# Patient Record
Sex: FEMALE | Race: WHITE | NOT HISPANIC OR LATINO | Employment: FULL TIME | URBAN - METROPOLITAN AREA
[De-identification: names, ages, dates, MRNs, and addresses within clinical notes are randomized per-mention and may not be internally consistent; named-entity substitution may affect disease eponyms.]

---

## 2018-01-25 PROBLEM — M48.02 CERVICAL STENOSIS OF SPINAL CANAL: Status: ACTIVE | Noted: 2018-01-25

## 2023-04-23 ENCOUNTER — APPOINTMENT (OUTPATIENT)
Dept: CT IMAGING | Facility: HOSPITAL | Age: 58
DRG: 66 | End: 2023-04-23

## 2023-04-23 ENCOUNTER — HOSPITAL ENCOUNTER (INPATIENT)
Facility: HOSPITAL | Age: 58
LOS: 4 days | Discharge: HOME | DRG: 66 | End: 2023-04-27
Attending: INTERNAL MEDICINE | Admitting: STUDENT IN AN ORGANIZED HEALTH CARE EDUCATION/TRAINING PROGRAM

## 2023-04-23 ENCOUNTER — APPOINTMENT (OUTPATIENT)
Dept: OTHER | Facility: HOSPITAL | Age: 58
DRG: 66 | End: 2023-04-23

## 2023-04-23 DIAGNOSIS — M48.02 CERVICAL STENOSIS OF SPINAL CANAL: Primary | ICD-10-CM

## 2023-04-23 DIAGNOSIS — G43.009 MIGRAINE WITHOUT AURA AND WITHOUT STATUS MIGRAINOSUS, NOT INTRACTABLE: Chronic | ICD-10-CM

## 2023-04-23 DIAGNOSIS — R27.0 ATAXIA: ICD-10-CM

## 2023-04-23 PROBLEM — G93.89 BRAIN MASS: Status: ACTIVE | Noted: 2023-04-23

## 2023-04-23 PROBLEM — G43.909 MIGRAINES: Chronic | Status: ACTIVE | Noted: 2023-04-23

## 2023-04-23 PROBLEM — Z72.0 TOBACCO ABUSE: Chronic | Status: ACTIVE | Noted: 2023-04-23

## 2023-04-23 LAB
ALBUMIN SERPL-MCNC: 4.4 G/DL (ref 3.5–5.2)
ALBUMIN/GLOB SERPL: 1.7 G/DL
ALP SERPL-CCNC: 129 U/L (ref 39–117)
ALT SERPL W P-5'-P-CCNC: 7 U/L (ref 1–33)
ANION GAP SERPL CALCULATED.3IONS-SCNC: 12 MMOL/L (ref 5–15)
AST SERPL-CCNC: 14 U/L (ref 1–32)
BASOPHILS # BLD AUTO: 0.07 10*3/MM3 (ref 0–0.2)
BASOPHILS NFR BLD AUTO: 0.4 % (ref 0–1.5)
BILIRUB SERPL-MCNC: 0.2 MG/DL (ref 0–1.2)
BUN SERPL-MCNC: 8 MG/DL (ref 6–20)
BUN/CREAT SERPL: 12.7 (ref 7–25)
CALCIUM SPEC-SCNC: 9.5 MG/DL (ref 8.6–10.5)
CHLORIDE SERPL-SCNC: 109 MMOL/L (ref 98–107)
CO2 SERPL-SCNC: 19 MMOL/L (ref 22–29)
CREAT SERPL-MCNC: 0.63 MG/DL (ref 0.57–1)
DEPRECATED RDW RBC AUTO: 51 FL (ref 37–54)
EGFRCR SERPLBLD CKD-EPI 2021: 103.6 ML/MIN/1.73
EOSINOPHIL # BLD AUTO: 0 10*3/MM3 (ref 0–0.4)
EOSINOPHIL NFR BLD AUTO: 0 % (ref 0.3–6.2)
ERYTHROCYTE [DISTWIDTH] IN BLOOD BY AUTOMATED COUNT: 13.8 % (ref 12.3–15.4)
GLOBULIN UR ELPH-MCNC: 2.6 GM/DL
GLUCOSE BLDC GLUCOMTR-MCNC: 129 MG/DL (ref 70–130)
GLUCOSE SERPL-MCNC: 162 MG/DL (ref 65–99)
HCT VFR BLD AUTO: 44.9 % (ref 34–46.6)
HGB BLD-MCNC: 15 G/DL (ref 12–15.9)
IMM GRANULOCYTES # BLD AUTO: 0.15 10*3/MM3 (ref 0–0.05)
IMM GRANULOCYTES NFR BLD AUTO: 0.9 % (ref 0–0.5)
LYMPHOCYTES # BLD AUTO: 1.02 10*3/MM3 (ref 0.7–3.1)
LYMPHOCYTES NFR BLD AUTO: 6.3 % (ref 19.6–45.3)
MCH RBC QN AUTO: 33.2 PG (ref 26.6–33)
MCHC RBC AUTO-ENTMCNC: 33.4 G/DL (ref 31.5–35.7)
MCV RBC AUTO: 99.3 FL (ref 79–97)
MONOCYTES # BLD AUTO: 0.03 10*3/MM3 (ref 0.1–0.9)
MONOCYTES NFR BLD AUTO: 0.2 % (ref 5–12)
NEUTROPHILS NFR BLD AUTO: 14.85 10*3/MM3 (ref 1.7–7)
NEUTROPHILS NFR BLD AUTO: 92.2 % (ref 42.7–76)
NRBC BLD AUTO-RTO: 0 /100 WBC (ref 0–0.2)
PLATELET # BLD AUTO: 417 10*3/MM3 (ref 140–450)
PMV BLD AUTO: 10.4 FL (ref 6–12)
POTASSIUM SERPL-SCNC: 4 MMOL/L (ref 3.5–5.2)
PROT SERPL-MCNC: 7 G/DL (ref 6–8.5)
RBC # BLD AUTO: 4.52 10*6/MM3 (ref 3.77–5.28)
SODIUM SERPL-SCNC: 140 MMOL/L (ref 136–145)
WBC NRBC COR # BLD: 16.12 10*3/MM3 (ref 3.4–10.8)

## 2023-04-23 PROCEDURE — 80053 COMPREHEN METABOLIC PANEL: CPT | Performed by: NURSE PRACTITIONER

## 2023-04-23 PROCEDURE — 82962 GLUCOSE BLOOD TEST: CPT

## 2023-04-23 PROCEDURE — 71260 CT THORAX DX C+: CPT

## 2023-04-23 PROCEDURE — 74177 CT ABD & PELVIS W/CONTRAST: CPT

## 2023-04-23 PROCEDURE — 99223 1ST HOSP IP/OBS HIGH 75: CPT | Performed by: NURSE PRACTITIONER

## 2023-04-23 PROCEDURE — G0378 HOSPITAL OBSERVATION PER HR: HCPCS

## 2023-04-23 PROCEDURE — 25510000001 IOPAMIDOL 61 % SOLUTION: Performed by: INTERNAL MEDICINE

## 2023-04-23 PROCEDURE — 99223 1ST HOSP IP/OBS HIGH 75: CPT

## 2023-04-23 PROCEDURE — 25010000002 DEXAMETHASONE PER 1 MG

## 2023-04-23 PROCEDURE — 25010000002 LEVETIRACETAM IN NACL 0.82% 500 MG/100ML SOLUTION

## 2023-04-23 PROCEDURE — 85025 COMPLETE CBC W/AUTO DIFF WBC: CPT | Performed by: NURSE PRACTITIONER

## 2023-04-23 RX ORDER — BISACODYL 10 MG
10 SUPPOSITORY, RECTAL RECTAL DAILY PRN
Status: DISCONTINUED | OUTPATIENT
Start: 2023-04-23 | End: 2023-04-27 | Stop reason: HOSPADM

## 2023-04-23 RX ORDER — POLYETHYLENE GLYCOL 3350 17 G/17G
17 POWDER, FOR SOLUTION ORAL DAILY PRN
Status: DISCONTINUED | OUTPATIENT
Start: 2023-04-23 | End: 2023-04-27 | Stop reason: HOSPADM

## 2023-04-23 RX ORDER — ACETAMINOPHEN 325 MG/1
650 TABLET ORAL EVERY 4 HOURS PRN
Status: DISCONTINUED | OUTPATIENT
Start: 2023-04-23 | End: 2023-04-27 | Stop reason: HOSPADM

## 2023-04-23 RX ORDER — LEVETIRACETAM 5 MG/ML
500 INJECTION INTRAVASCULAR EVERY 12 HOURS SCHEDULED
Status: DISCONTINUED | OUTPATIENT
Start: 2023-04-23 | End: 2023-04-27 | Stop reason: HOSPADM

## 2023-04-23 RX ORDER — DEXAMETHASONE SODIUM PHOSPHATE 4 MG/ML
4 INJECTION, SOLUTION INTRA-ARTICULAR; INTRALESIONAL; INTRAMUSCULAR; INTRAVENOUS; SOFT TISSUE EVERY 6 HOURS
Status: DISCONTINUED | OUTPATIENT
Start: 2023-04-23 | End: 2023-04-24

## 2023-04-23 RX ORDER — SODIUM CHLORIDE 0.9 % (FLUSH) 0.9 %
10 SYRINGE (ML) INJECTION AS NEEDED
Status: DISCONTINUED | OUTPATIENT
Start: 2023-04-23 | End: 2023-04-27 | Stop reason: HOSPADM

## 2023-04-23 RX ORDER — AMOXICILLIN 250 MG
2 CAPSULE ORAL 2 TIMES DAILY PRN
Status: DISCONTINUED | OUTPATIENT
Start: 2023-04-23 | End: 2023-04-27 | Stop reason: HOSPADM

## 2023-04-23 RX ORDER — ALPRAZOLAM 0.5 MG/1
0.5 TABLET ORAL 3 TIMES DAILY PRN
Status: DISCONTINUED | OUTPATIENT
Start: 2023-04-23 | End: 2023-04-27 | Stop reason: HOSPADM

## 2023-04-23 RX ORDER — FAMOTIDINE 20 MG/1
40 TABLET, FILM COATED ORAL DAILY
Status: DISCONTINUED | OUTPATIENT
Start: 2023-04-23 | End: 2023-04-27 | Stop reason: HOSPADM

## 2023-04-23 RX ORDER — SODIUM CHLORIDE 9 MG/ML
40 INJECTION, SOLUTION INTRAVENOUS AS NEEDED
Status: DISCONTINUED | OUTPATIENT
Start: 2023-04-23 | End: 2023-04-27 | Stop reason: HOSPADM

## 2023-04-23 RX ORDER — ACETAMINOPHEN 650 MG/1
650 SUPPOSITORY RECTAL EVERY 4 HOURS PRN
Status: DISCONTINUED | OUTPATIENT
Start: 2023-04-23 | End: 2023-04-27 | Stop reason: HOSPADM

## 2023-04-23 RX ORDER — HYDROCODONE BITARTRATE AND ACETAMINOPHEN 5; 325 MG/1; MG/1
1 TABLET ORAL ONCE AS NEEDED
Status: COMPLETED | OUTPATIENT
Start: 2023-04-23 | End: 2023-04-23

## 2023-04-23 RX ORDER — BISACODYL 5 MG/1
5 TABLET, DELAYED RELEASE ORAL DAILY PRN
Status: DISCONTINUED | OUTPATIENT
Start: 2023-04-23 | End: 2023-04-27 | Stop reason: HOSPADM

## 2023-04-23 RX ORDER — ACETAMINOPHEN 160 MG/5ML
650 SOLUTION ORAL EVERY 4 HOURS PRN
Status: DISCONTINUED | OUTPATIENT
Start: 2023-04-23 | End: 2023-04-27 | Stop reason: HOSPADM

## 2023-04-23 RX ORDER — SODIUM CHLORIDE 9 MG/ML
100 INJECTION, SOLUTION INTRAVENOUS CONTINUOUS
Status: DISCONTINUED | OUTPATIENT
Start: 2023-04-23 | End: 2023-04-27 | Stop reason: HOSPADM

## 2023-04-23 RX ORDER — SODIUM CHLORIDE 0.9 % (FLUSH) 0.9 %
10 SYRINGE (ML) INJECTION EVERY 12 HOURS SCHEDULED
Status: DISCONTINUED | OUTPATIENT
Start: 2023-04-23 | End: 2023-04-27 | Stop reason: HOSPADM

## 2023-04-23 RX ORDER — NICOTINE 21 MG/24HR
1 PATCH, TRANSDERMAL 24 HOURS TRANSDERMAL
Status: DISCONTINUED | OUTPATIENT
Start: 2023-04-23 | End: 2023-04-27 | Stop reason: HOSPADM

## 2023-04-23 RX ADMIN — Medication 1 PATCH: at 19:42

## 2023-04-23 RX ADMIN — DEXAMETHASONE SODIUM PHOSPHATE 4 MG: 4 INJECTION INTRA-ARTICULAR; INTRALESIONAL; INTRAMUSCULAR; INTRAVENOUS; SOFT TISSUE at 21:07

## 2023-04-23 RX ADMIN — Medication 10 ML: at 21:08

## 2023-04-23 RX ADMIN — HYDROCODONE BITARTRATE AND ACETAMINOPHEN 1 TABLET: 5; 325 TABLET ORAL at 23:19

## 2023-04-23 RX ADMIN — LEVETIRACETAM 500 MG: 5 INJECTION INTRAVASCULAR at 21:07

## 2023-04-23 RX ADMIN — FAMOTIDINE 40 MG: 20 TABLET, FILM COATED ORAL at 19:39

## 2023-04-23 RX ADMIN — ACETAMINOPHEN 325MG 650 MG: 325 TABLET ORAL at 21:02

## 2023-04-23 RX ADMIN — IOPAMIDOL 80 ML: 612 INJECTION, SOLUTION INTRAVENOUS at 22:17

## 2023-04-23 NOTE — PLAN OF CARE
Goal Outcome Evaluation:           Progress: no change  Outcome Evaluation: Patient A&Ox4, denies any neuro deficits at this time. Outside films reviewed by neurosurgery- No obvious stroke, appears as mass. Patient/family updated on assessment and plan of care.

## 2023-04-23 NOTE — H&P
[facility] Medicine Services  HISTORY AND PHYSICAL    Patient Name: Charley Mckee  : 1965  MRN: [id number]  Primary Care Physician: Ana Maria Garcia MD  Date of admission: 2023    Subjective   Subjective     Chief Complaint:  H/a, brain mass, ataxia     HPI:  Charley Mckee is a 58 y.o. female with past medical history significant only for tobacco abuse.  Patient does not see a physician regularly.  Reports history of remote right hip pain with questionable bone lesion in 2018 with final work-up from Dr. Star Chiang negative.  Presents to [facility] on Tuesday, 2023 secondary to headache, short term memory deficits,  right upper & lower extremity numbness/tingling, balance disturbance and mild vision deficit in right eye.  Was treated for migraine and discharged home.  Reports her balance difficulties, STM deficits, and right-sided numbness/ tingling persisted.  Developed headache again this morning and presents back to ER at [facility].  CT showed CVA vs mass in the brain.  MRI consistent with Lt occipital mass.  She was transferred to [facility] for higher level of care.      Review of Systems   Constitutional: Positive for activity change and fatigue. Negative for appetite change, chills, diaphoresis, fever and unexpected weight change.   Eyes: Positive for visual disturbance.        Rt eye vision change, blurred and floaters in visual field    Respiratory: Negative.    Cardiovascular: Negative.    Gastrointestinal: Negative.    Endocrine: Negative.    Genitourinary: Negative.    Musculoskeletal: Negative.    Skin: Negative.    Allergic/Immunologic: Negative.    Neurological: Positive for numbness and headaches. Negative for dizziness, seizures, syncope, facial asymmetry, speech difficulty and light-headedness.   Psychiatric/Behavioral: Positive for decreased concentration. The patient is nervous/anxious.              Personal History     Past Medical History:   Diagnosis Date   • Arthritis    • Headache    • Migraines 4/23/2023   • Tobacco abuse 4/23/2023             Past Surgical History:   Procedure Laterality Date   • BREAST EXCISIONAL BIOPSY Right     AGE 36   • NECK SURGERY      ACDF C4-5, C5-6 w/ Dr. Aleman       Family History:  family history includes Breast cancer in her maternal aunt, maternal aunt, and maternal aunt; Breast cancer (age of onset: 74) in her mother; Cancer in her father, mother, and son; Diabetes in her father and mother; Heart disease in her father; Stroke in her father and mother.     Social History:  reports that she has been smoking cigarettes. She has a 40.00 pack-year smoking history. She has never used smokeless tobacco. She reports that she does not drink alcohol and does not use drugs.  Social History     Social History Narrative    .  Lives with . Independent at baseline.  No HH, DMe or oxygen        Medications: None regularly        No Known Allergies    Objective   Objective     Vital Signs:   Temp:  [98.8 °F (37.1 °C)] 98.8 °F (37.1 °C)  Heart Rate:  [75] 75  Resp:  [18] 18  BP: (121)/(43) 121/43    Physical Exam   Constitutional: Awake, alert.  Resting up in bed with family at bedside.  No acute distress.  Eyes: PERRLA, sclerae anicteric, no conjunctival injection.  No nystagmus.  EOMs intact.  HENT: NCAT, mucous membranes moist  Neck: Supple, no thyromegaly, no lymphadenopathy, trachea midline  Respiratory: Clear to auscultation bilaterally, nonlabored respirations on room air.  Sats 98%.  Cardiovascular: RRR, no murmurs, rubs, or gallops, palpable pedal pulses bilaterally  Gastrointestinal: Positive bowel sounds, soft, nontender, nondistended  Musculoskeletal: No bilateral ankle edema, no clubbing or cyanosis to extremities.  SANDOVAL spontaneously.  Psychiatric: Appropriate affect, cooperative  Neurologic: Oriented x 3, strength symmetric in all extremities, Cranial  Nerves grossly intact to confrontation, speech clear.  No focal deficits.  Reports intermittent numbness and tingling to right upper and lower extremity.  Good sensation to right side on exam.  Skin: No rashes      Result Review:  I have personally reviewed the results from the time of this admission to 4/23/2023 18:38 EDT and agree with these findings:  [x]  Laboratory list / accordion  []  Microbiology  [x]  Radiology  []  EKG/Telemetry   []  Cardiology/Vascular   []  Pathology  [x]  Old records  [x]  Other: OSH records  Most notable findings include:     LAB RESULTS:      Lab 04/18/23  1123   PROTIME 9.6         Lab 04/18/23  1123   MAGNESIUM 1.7*             Lab 04/18/23  1123   PROTIME 9.6   INR 0.91                 Brief Urine Lab Results     None        Microbiology Results (last 10 days)     ** No results found for the last 240 hours. **          MRI Outside Films    Result Date: 4/23/2023  This procedure was auto-finalized with no dictation required.    CT Outside Films    Result Date: 4/23/2023  This procedure was auto-finalized with no dictation required.    CT Outside Films    Result Date: 4/23/2023  This procedure was auto-finalized with no dictation required.          Assessment & Plan   Assessment & Plan       Ataxia    Brain mass    Migraines    Tobacco abuse    Charley Mckee is a 58 y.o. female with past medical history significant only for tobacco abuse.  Patient does not see a physician regularly.  Reports history of remote right hip pain with questionable bone lesion in June 2018 with final work-up from Dr. Star Chiang negative.  Presents to [facility] on Tuesday, 4/18/2023 secondary to headache, short term memory deficits,  right upper & lower extremity numbness/tingling, balance disturbance and mild vision deficit in right eye.  Was treated for migraine and discharged home.  Reports her balance difficulties, STM deficits, and right-sided numbness/ tingling persisted.   Developed headache again this morning and presents back to ER at [facility].  CT showed CVA vs mass in the brain.  MRI consistent with Lt occipital mass.  She was transferred to [facility] for higher level of care.      Assessment/Plan:    Left occipital brain mass  Headaches  Ataxia  Right eye visual deficit/floaters  Rt upper/lower ext intermittent numbness and tingling  -- Mass found on CT/MRI from OSH [facility] today.  -- Stroke neuro consulted.  [place] not to be stroke related.  -- Neurosurgery consulted.  ER spoke with Henry Hagen PA-C.  They will see tomorrow.  -- Decadron 4 mg IV every 6 hrs  -- Symptom management.  -- Checking CT abdomen/pelvis and chest with contrast  -- monitor labs     History right hip pain w ?  Lytic lesion from [facility] 2018  -- Was followed by Dr. Chiang with oncology.  Per chart review and patient report work-up turned out to be negative for malignancy.  No further issues.    Tobacco abuse  -- Assist/advise, nicotine patch      DVT prophylaxis:  sequentials    CODE STATUS:    Level Of Support Discussed With: Patient  Code Status (Patient has no pulse and is not breathing): CPR (Attempt to Resuscitate)  Medical Interventions (Patient has pulse or is breathing): Full Support      Expected Discharge    Expected Discharge Date and Time     Expected Discharge Date Expected Discharge Time    Apr 27, 2023             This note has been completed as part of a split-shared workflow.     Signature: Electronically signed by More Gregg, ANJALI, 04/23/23, 6:31 PM EDT    Total time spent: 70 minutes   Time spent includes time reviewing chart, face-to-face time, counseling patient/family/caregiver, ordering medications/tests/procedures, communicating with other health care professionals, documenting clinical information in the electronic health record, and coordination of care.

## 2023-04-23 NOTE — NURSING NOTE
"  ACC REVIEW REPORT: [facility]        PATIENT NAME: Charley Huber/Rafi    PATIENT ID: [id number]    BED: S 330    BED TYPE: telemetry    BED GIVEN TO: Brendan    TIME BED GIVEN: 1457 (after waiting for bed to be cleaned)    TODAY'S DATE: 4/23/2023    TRANSFER DATE: 4-23-23    ETA: after 1530    TRANSFERRING FACILITY: [facility]    TRANSFERRING FACILITY PHONE # : [phone]    TRANSFERRING MD: OMID Ramirez    DATE/TIME REQUEST RECEIVED: 4-23-23@8851    Located within [facility] RN: Laquita Mishra    REPORT FROM: Marj in the ED    TIME REPORT TAKEN: 1346    DIAGNOSIS: brain mass vs. Possible CVA    REASON FOR TRANSFER TO [facility] within [facility]: higher level of care    TRANSPORTATION: EMS    CLINICAL REASON FOR TRANSFER TO [facility] within [facility]: 58 y.o. saw her pc provider last Tuesday and was given topiramate & fioricet for suspected migraine - this has not helped  Found to have a brain mass      CLINICAL INFORMATION    HEIGHT: 5'1\"    WEIGHT: 49.9kg    ALLERGIES: NKA    INFECTIOUS DISEASE:     ISOLATION:     VITAL SIGNS:   TIME: 1330  TEMP: 98.8  PULSE: 74  B/P: 157/68  RESP: 20        LAB INFORMATION: bun 9, Cr 0.7, WBC 10, H&H: 14/43, plt 430    CULTURE INFORMATION:     MEDS/IV FLUIDS: #20 left ac - SL  Morphine 2mg given in the ED      CARDIAC SYSTEM:    CHEST PAIN: none reported    RHYTHM: NSR    Is patient taking or has patient been given any drugs that could increase bleeding? no      RESPIRATORY SYSTEM:    OXYGEN: no    O2 SAT: 98% on RA    RESPIRATORY STATUS: RR 20      CNS/MUSCULOSKELETAL    ALERT AND ORIENTED:  yes    CAT SCAN RESULTS: two opacities; ? old infarct    MRI RESULTS: 2.6cm mass left occipital lobe; likely primary brain mass or metastasis    CNS/MUSCULOSKELETAL NOTES: NIHSS not done  Pt ambulatory, gate is steady; pt is a&o and appropriate; no slurred speech; SANDOVAL  Only c/o is HA - it was severe on arrival, now it is mild after the morphine      GI//GY    VOMITING: no    NAUSEA: no    PAST MEDICAL HISTORY: hx of migraine x1 a few " years ago; smoker    ADDITIONAL NOTES: no home meds (except for topiramate/fioricet)  Daughter works at [employer] ?    Stroke nurse practitioner consulted: Jennie  Stroke MD:  Dr Nicola Mishra, RN  4/23/2023  14:14 EDT

## 2023-04-23 NOTE — CONSULTS
Stroke Consult Note    Patient Name: Charley Mckee   MRN: [id number]  Age: 58 y.o.  Sex: female  : 1965    Primary Care Physician: Ana Maria Garcia MD  Referring Physician: Mayra Quijano PA-C, [facility]    TIME STROKE TEAM CALLED: 1328 EST       TIME PATIENT ARRIVED AT [facility]: 1622 EST  TIME PATIENT SEEN: 1635 EST    Handedness: Right  Race:     Chief Complaint/Reason for Consultation: Intermittent right-sided numbness, memory loss, headache, and balance difficulties    HPI: Mrs. Mckee is a 58-year-old female with known medical diagnoses of arthritis, prior ACDF (C4-C5 and C6 5 to C6), and ongoing tobacco abuse who presents to [facility] as a transfer from [facility].  She presented to their facility today with complaints of headache and intermittent right-sided numbness and balance difficulties which have been ongoing since 2023.  When her symptoms initially started she was evaluated at a hospital in [place] where she underwent a CT of the head without contrast which was reported as normal and she was treated for a complex migraine and discharged home on Topamax and Fioricet.  Since that time, she reports that she has noticed increased memory problems, blurry vision, occasional word finding difficulties, intermittent right-sided numbness (face, upper extremity, and lower extremity), and gait instability.  She denies any recent fall, unilateral weakness, dysarthria, dysphagia, or diplopia.    The patient reports that she does not have a personal history of TIA/CVA however her mother did suffer from a mild stroke in her 70s.  She does have a family history of cardiac disease and cancer in her father.  She tells me that she does not normally see a doctor and takes no daily medications.  She smokes approximately 1 pack/day x 39 years however does not consume alcohol or utilize illicit drugs.    Per report the patient underwent a CT head  without contrast which was negative for hemorrhage and/or acute process.  She also underwent a CTA of the head/neck which was negative for flow-limiting stenosis or large vessel occlusive stroke.  MRI of the brain with and without contrast was also obtained and was concerning for brain mass +/- acute ischemic stroke.  Per radiology report the patient was noted to have hyperdensities within the left occipital lobe (mass of 2.6cm with mild edema), left thalamus, and left basal ganglia.  She was transferred to our facility for higher level of care and further evaluation by stroke neurology and/or neurosurgery.  The patient tells me that she has never been diagnosed with cancer.  She does recall that she has had an increase in the number of headaches that she has over the past months with the worst headache being last Tuesday prompting her to seek medical treatment at [facility].    Last Known Normal Date/Time: 4/18/2023 EST     Review of Systems   Constitutional: Negative.    HENT: Negative for trouble swallowing.    Eyes: Positive for visual disturbance.   Respiratory: Negative.    Cardiovascular: Negative.    Gastrointestinal: Negative.  Negative for blood in stool, diarrhea, nausea and vomiting.   Genitourinary: Negative.  Negative for hematuria.   Musculoskeletal: Positive for gait problem.   Skin: Negative.    Neurological: Positive for speech difficulty, numbness and headaches. Negative for dizziness, seizures and weakness.   Hematological: Negative.    Psychiatric/Behavioral: Positive for confusion.      Past Medical History:   Diagnosis Date   • Arthritis    • Headache      Past Surgical History:   Procedure Laterality Date   • BREAST EXCISIONAL BIOPSY Right     AGE 36   • NECK SURGERY      ACDF C4-5, C5-6 w/ Dr. Aleman     Family History   Problem Relation Age of Onset   • Cancer Mother    • Stroke Mother    • Breast cancer Mother 74   • Heart disease Father    • Stroke Father    • Diabetes  Father    • Cancer Father    • Cancer Son    • Breast cancer Maternal Aunt         DX AGE UNKNOWN   • Breast cancer Maternal Aunt         DX AGE UNKNOWN   • Breast cancer Maternal Aunt         DX AGE UNKNOWN   • Ovarian cancer Neg Hx      Social History     Socioeconomic History   • Marital status: Single   Tobacco Use   • Smoking status: Every Day     Packs/day: 1.00     Years: 40.00     Pack years: 40.00     Types: Cigarettes   • Smokeless tobacco: Never   Vaping Use   • Vaping Use: Never used   Substance and Sexual Activity   • Drug use: Never   • Sexual activity: Defer     No Known Allergies  Prior to Admission medications    Medication Sig Start Date End Date Taking? Authorizing Provider   benzonatate (TESSALON) 100 MG capsule TAKE 1 TO 2 CAPSULES BY MOUTH EVERY 8 HOURS AS NEEDED 6/5/18 4/23/23  Inessa Crawford MD   DULoxetine (CYMBALTA) 20 MG capsule Take 20 mg by mouth Daily. 6/5/18 4/23/23  Inessa Crawford MD   HYDROcodone-acetaminophen (NORCO) 5-325 MG per tablet Take 1 tablet by mouth Every 6 (Six) Hours. 5/20/18 4/23/23  Inessa Crawford MD   naproxen sodium (ALEVE) 220 MG tablet Take 220 mg by mouth 2 (Two) Times a Day As Needed.  4/23/23  Inessa Crawford MD   predniSONE (DELTASONE) 10 MG tablet Take 10 mg by mouth Daily. 6/21/18 4/23/23  Inessa Crawford MD   PROAIR  (90 Base) MCG/ACT inhaler INHALE 1 TO 2 PUFF S  BY MOUTH EVERY 4 TO 6 HOURS AS NEEDED 6/5/18 4/23/23  Inessa Crawford MD   promethazine (PHENERGAN) 25 MG tablet Take 25 mg by mouth every night at bedtime. 6/5/18 4/23/23  Inessa Crawford MD   SYMBICORT 80-4.5 MCG/ACT inhaler USE 2 INHALATIONS BY MOUTH TWICE DAILY 6/5/18 4/23/23  Inessa Crawford MD   traZODone (DESYREL) 50 MG tablet Take 50 mg by mouth every night at bedtime. 5/21/18 4/23/23  Inessa Crawford MD         Temp:  [98.8 °F (37.1 °C)] 98.8 °F (37.1 °C)  Heart Rate:  [75] 75  Resp:  [18] 18  BP: (121)/(43)  121/43  Neurological Exam  Mental Status  Alert. Oriented to person, place, time and situation. Oriented to person, place, and time. Speech is normal. Language is fluent with no aphasia. Attention and concentration are normal. Fund of knowledge is appropriate for level of education.    Cranial Nerves  CN II: Visual fields full to confrontation.  CN III, IV, VI: Extraocular movements intact bilaterally. Pupils equal round and reactive to light bilaterally.  CN V: Facial sensation is normal.  CN VII: Full and symmetric facial movement.  CN VIII: Hearing intact bilaterally.  CN IX, X: Palate elevates symmetrically  CN XII: Tongue midline without atrophy or fasciculations.    Motor  Normal muscle bulk throughout. No fasciculations present. Normal muscle tone. Strength is 5/5 throughout all four extremities.    Sensory  Sensation is intact to light touch, pinprick, vibration and proprioception in all four extremities.    Coordination  Right: Finger-to-nose normal.Left: Finger-to-nose normal.    Gait    No observed.      Physical Exam  Vitals reviewed.   Constitutional:       General: She is not in acute distress.     Appearance: Normal appearance. She is not ill-appearing.   HENT:      Head: Normocephalic and atraumatic.      Mouth/Throat:      Mouth: Mucous membranes are moist.   Eyes:      Extraocular Movements: Extraocular movements intact.      Pupils: Pupils are equal, round, and reactive to light.   Cardiovascular:      Rate and Rhythm: Normal rate and regular rhythm.   Pulmonary:      Effort: Pulmonary effort is normal. No respiratory distress.      Comments: On room air  Skin:     General: Skin is warm and dry.   Neurological:      General: No focal deficit present.      Mental Status: She is alert and oriented to person, place, and time.      Motor: Motor strength is normal.   Psychiatric:         Mood and Affect: Mood normal.         Speech: Speech normal.         Behavior: Behavior normal.         Acute Stroke  Data    Thrombolytic Inclusion / Exclusion Criteria    Time: 16:55 EDT  Person Administering Scale: ANJALI Devine    Inclusion Criteria  [x]   18 years of age or greater   []   Onset of symptoms < 4.5 hours before beginning treatment (stroke onset = time patient was last seen well or without symptoms).   []   Diagnosis of acute ischemic stroke causing measurable disabling deficit (Complete Hemianopia, Any Aphasia, Visual or Sensory Extinction, Any weakness limiting sustained effort against gravity)   []   Any remaining deficit considered potentially disabling in view of patient and practitioner   Exclusion criteria (Do not proceed with Alteplase if any are checked under exclusion criteria)  [x]   Onset unknown or GREATER than 4.5 hours   []   ICH on CT/MRI   []   CT demonstrates hypodensity representing acute or subacute infarct   []   Significant head trauma or prior stroke in the previous 3 months   []   Symptoms suggestive of subarachnoid hemorrhage   []   History of un-ruptured intracranial aneurysm GREATER than 10 mm   []   Recent intracranial or intraspinal surgery within the last 3 months   []   Arterial puncture at a non-compressible site in the previous 7 days   []   Active internal bleeding   []   Acute bleeding tendency   []   Platelet count LESS than 100,000 for known hematological diseases such as leukemia, thrombocytopenia or chronic cirrhosis   []   Current use of anticoagulant with INR GREATER than 1.7 or PT GREATER than 15 seconds, aPTT GREATER than 40 seconds   []   Heparin received within 48 hours, resulting in abnormally elevated aPTT GREATER than upper limit of normal   []   Current use of direct thrombin inhibitors or direct factor Xa inhibitors in the past 48 hours   []   Elevated blood pressure refractory to treatment (systolic GREATER than 185 mm/Hg or diastolic  GREATER than 110 mm/Hg   []   Suspected infective endocarditis and aortic arch dissection   []   Current use of  therapeutic treatment dose of low-molecular-weight heparin (LMWH) within the previous 24 hours   []   Structural GI malignancy or bleed   Relative exclusion for all patients  []   Only minor non-disabling symptoms   []   Pregnancy   []   Seizure at onset with postictal residual neurological impairments   []   Major surgery or previous trauma within past 14 days   []   History of previous spontaneous ICH, intracranial neoplasm, or AV malformation   []   Postpartum (within previous 14 days)   []   Recent GI or urinary tract hemorrhage (within previous 21 days)   []   Recent acute MI (within previous 3 months)   []   History of un-ruptured intracranial aneurysm LESS than 10 mm   []   History of ruptured intracranial aneurysm   []   Blood glucose LESS than 50 mg/dL (2.7 mmol/L)   []   Dural puncture within the last 7 days   []   Known GREATER than 10 cerebral microbleeds   Additional exclusions for patients with symptoms onset between 3 and 4.5 hours.  []   Age > 80.   []   On any anticoagulants regardless of INR  >>> Warfarin (Coumadin), Heparin, Enoxaparin (Lovenox), fondaparinux (Arixtra), bivalirudin (Angiomax), Argatroban, dabigatran (Pradaxa), rivaroxaban (Xarelto), or apixaban (Eliquis)   []   Severe stroke (NIHSS > 25).   []   History of BOTH diabetes and previous ischemic stroke.   []   The risks and benefits have been discussed with the patient or family related to the administration of IV thrombolytic therapy for stroke symptoms.   []   I have discussed and reviewed the patient's case and imaging with the attending prior to IV thrombolytic therapy.   N/A Time IV thrombolytic administered       Hospital Meds:  Scheduled-   Infusions- No current facility-administered medications for this encounter.     PRNs-     Functional Status Prior to Current Stroke/Kait Score: 0    NIH Stroke Scale  Time: 16:55 EDT  Person Administering Scale: Jennie Pat, ANJALI    1a  Level of consciousness: 0=alert; keenly  responsive   1b. LOC questions:  0=Answers both questions correctly   1c. LOC commands: 0=Performs both tasks correctly   2.  Best Gaze: 0=normal   3.  Visual: 0=No visual loss   4. Facial Palsy: 0=Normal symmetric movement   5a.  Motor left arm: 0=No drift, limb holds 90 (or 45) degrees for full 10 seconds   5b.  Motor right arm: 0=No drift, limb holds 90 (or 45) degrees for full 10 seconds   6a. motor left le=No drift, limb holds 90 (or 45) degrees for full 10 seconds   6b  Motor right le=No drift, limb holds 90 (or 45) degrees for full 10 seconds   7. Limb Ataxia: 0=Absent   8.  Sensory: 0=Normal; no sensory loss   9. Best Language:  0=No aphasia, normal   10. Dysarthria: 0=Normal   11. Extinction and Inattention: 0=No abnormality    Total:   0       Results Reviewed:  I have personally reviewed current lab, radiology, and data and agree with results.    MRI of the brain with and without contrast reveals a mass within the left occipital region.  Additionally there are 2 hyperintensities noted in the right basal ganglia and thalamus which are present on ADC and DWI sequences; atypical for acute or chronic infarct, patient is asymptomatic.    Labs from OSH    Sodium 138  Potassium 3.6  Glucose 152  Calcium 8 0.4  Creatinine 0.61, BUN 6  Magnesium 1.7  Urinalysis +1 bacteria, trace leukocytes, negative nitrite      Assessment/Plan:    This is a 58-year-old female with known medical diagnoses of arthritis, prior ACDF (C4-C5 and C6 5 to C6), and ongoing tobacco abuse who presents to [facility] as a transfer from [facility].  She presented to their facility today with complaints of headache and intermittent right-sided numbness and balance difficulties which have been ongoing since 2023.  Due to last known well and findings on CT/MRI reported by OSH she was not a candidate for IV thrombolytic therapy.  CTA head/neck at OSH was reported as negative for large vessel  occlusive stroke.    Antiplatelet PTA: None  Anticoagulant PTA: None        1. Intermittent right-sided numbness, balance difficulties, and headache  -MRI of the brain with and without contrast from OSH report concerning for primary versus metastatic brain masses versus acute ischemic stroke (reported left cerebellar, left basal ganglia, and left thalamus).  On my review of the patient's MRI, which was uploaded into epic, there appears to be a left occipital mass as well as hyperintensities within the right basal ganglia and right thalamus (present on DWI and ADC sequences).  No evidence of acute stroke.  -Mild cerebral edema was noted and the patient received Solu-Medrol 125 mg IV at approximately 1330.  -Patient's neurological status is stable, GCS 15, NIHSS 0  -Activity as tolerated  -Fall risk precautions  - I have discussed patient with Henry Hagen PA-C for neurosurgery who has also reviewed the patient's images and will consult on her in the morning.  He recommends recommend obtaining CT chest abdomen pelvis to evaluate for primary source and Decadron 4 mg IV every 6 hours starting at 2200.  -Keppra 500 mg IV twice daily for seizure prophylaxis given patient's intermittent numbness  -Seizure precautions    2. Ongoing tobacco abuse  -Patient will need daily tobacco cessation education    Plan of care was discussed with the patient and primary team.    No role for stroke neurology at this time.  Please call with any questions or concerns.  Thank you for this consult.    Jennie Pat, APRN  April 23, 2023  16:55 EDT

## 2023-04-24 ENCOUNTER — APPOINTMENT (OUTPATIENT)
Dept: GENERAL RADIOLOGY | Facility: HOSPITAL | Age: 58
DRG: 66 | End: 2023-04-24

## 2023-04-24 LAB
ANION GAP SERPL CALCULATED.3IONS-SCNC: 12 MMOL/L (ref 5–15)
BACTERIA UR QL AUTO: ABNORMAL /HPF
BASOPHILS # BLD AUTO: 0.04 10*3/MM3 (ref 0–0.2)
BASOPHILS NFR BLD AUTO: 0.1 % (ref 0–1.5)
BILIRUB UR QL STRIP: NEGATIVE
BUN SERPL-MCNC: 7 MG/DL (ref 6–20)
BUN/CREAT SERPL: 11.7 (ref 7–25)
CALCIUM SPEC-SCNC: 8.9 MG/DL (ref 8.6–10.5)
CHLORIDE SERPL-SCNC: 111 MMOL/L (ref 98–107)
CLARITY UR: CLEAR
CO2 SERPL-SCNC: 19 MMOL/L (ref 22–29)
COLOR UR: YELLOW
CREAT SERPL-MCNC: 0.6 MG/DL (ref 0.57–1)
CRP SERPL-MCNC: 0.73 MG/DL (ref 0–0.5)
DEPRECATED RDW RBC AUTO: 51.3 FL (ref 37–54)
EGFRCR SERPLBLD CKD-EPI 2021: 104.8 ML/MIN/1.73
EOSINOPHIL # BLD AUTO: 0 10*3/MM3 (ref 0–0.4)
EOSINOPHIL NFR BLD AUTO: 0 % (ref 0.3–6.2)
ERYTHROCYTE [DISTWIDTH] IN BLOOD BY AUTOMATED COUNT: 13.9 % (ref 12.3–15.4)
GLUCOSE SERPL-MCNC: 146 MG/DL (ref 65–99)
GLUCOSE UR STRIP-MCNC: NEGATIVE MG/DL
HBA1C MFR BLD: 5.2 % (ref 4.8–5.6)
HCT VFR BLD AUTO: 42.5 % (ref 34–46.6)
HGB BLD-MCNC: 14.3 G/DL (ref 12–15.9)
HGB UR QL STRIP.AUTO: NEGATIVE
HYALINE CASTS UR QL AUTO: ABNORMAL /LPF
IMM GRANULOCYTES # BLD AUTO: 0.26 10*3/MM3 (ref 0–0.05)
IMM GRANULOCYTES NFR BLD AUTO: 1 % (ref 0–0.5)
KETONES UR QL STRIP: NEGATIVE
LEUKOCYTE ESTERASE UR QL STRIP.AUTO: ABNORMAL
LYMPHOCYTES # BLD AUTO: 1.48 10*3/MM3 (ref 0.7–3.1)
LYMPHOCYTES NFR BLD AUTO: 5.5 % (ref 19.6–45.3)
MCH RBC QN AUTO: 33.5 PG (ref 26.6–33)
MCHC RBC AUTO-ENTMCNC: 33.6 G/DL (ref 31.5–35.7)
MCV RBC AUTO: 99.5 FL (ref 79–97)
MONOCYTES # BLD AUTO: 0.34 10*3/MM3 (ref 0.1–0.9)
MONOCYTES NFR BLD AUTO: 1.3 % (ref 5–12)
NEUTROPHILS NFR BLD AUTO: 25.01 10*3/MM3 (ref 1.7–7)
NEUTROPHILS NFR BLD AUTO: 92.1 % (ref 42.7–76)
NITRITE UR QL STRIP: NEGATIVE
NRBC BLD AUTO-RTO: 0 /100 WBC (ref 0–0.2)
PH UR STRIP.AUTO: 5.5 [PH] (ref 5–8)
PLATELET # BLD AUTO: 393 10*3/MM3 (ref 140–450)
PMV BLD AUTO: 10.8 FL (ref 6–12)
POTASSIUM SERPL-SCNC: 3.9 MMOL/L (ref 3.5–5.2)
PROCALCITONIN SERPL-MCNC: 0.02 NG/ML (ref 0–0.25)
PROT UR QL STRIP: NEGATIVE
RBC # BLD AUTO: 4.27 10*6/MM3 (ref 3.77–5.28)
RBC # UR STRIP: ABNORMAL /HPF
REF LAB TEST METHOD: ABNORMAL
SODIUM SERPL-SCNC: 142 MMOL/L (ref 136–145)
SP GR UR STRIP: 1.01 (ref 1–1.03)
SQUAMOUS #/AREA URNS HPF: ABNORMAL /HPF
TSH SERPL DL<=0.05 MIU/L-ACNC: 0.83 UIU/ML (ref 0.27–4.2)
UROBILINOGEN UR QL STRIP: ABNORMAL
WBC # UR STRIP: ABNORMAL /HPF
WBC NRBC COR # BLD: 27.13 10*3/MM3 (ref 3.4–10.8)

## 2023-04-24 PROCEDURE — 86140 C-REACTIVE PROTEIN: CPT | Performed by: INTERNAL MEDICINE

## 2023-04-24 PROCEDURE — 99232 SBSQ HOSP IP/OBS MODERATE 35: CPT | Performed by: INTERNAL MEDICINE

## 2023-04-24 PROCEDURE — 81001 URINALYSIS AUTO W/SCOPE: CPT | Performed by: INTERNAL MEDICINE

## 2023-04-24 PROCEDURE — 84443 ASSAY THYROID STIM HORMONE: CPT | Performed by: NURSE PRACTITIONER

## 2023-04-24 PROCEDURE — 25010000002 DEXAMETHASONE PER 1 MG

## 2023-04-24 PROCEDURE — 25010000002 KETOROLAC TROMETHAMINE PER 15 MG: Performed by: INTERNAL MEDICINE

## 2023-04-24 PROCEDURE — 93005 ELECTROCARDIOGRAM TRACING: CPT | Performed by: INTERNAL MEDICINE

## 2023-04-24 PROCEDURE — 80048 BASIC METABOLIC PNL TOTAL CA: CPT | Performed by: NURSE PRACTITIONER

## 2023-04-24 PROCEDURE — 99254 IP/OBS CNSLTJ NEW/EST MOD 60: CPT | Performed by: PHYSICIAN ASSISTANT

## 2023-04-24 PROCEDURE — 93010 ELECTROCARDIOGRAM REPORT: CPT | Performed by: STUDENT IN AN ORGANIZED HEALTH CARE EDUCATION/TRAINING PROGRAM

## 2023-04-24 PROCEDURE — 85025 COMPLETE CBC W/AUTO DIFF WBC: CPT | Performed by: NURSE PRACTITIONER

## 2023-04-24 PROCEDURE — 97161 PT EVAL LOW COMPLEX 20 MIN: CPT

## 2023-04-24 PROCEDURE — 71045 X-RAY EXAM CHEST 1 VIEW: CPT

## 2023-04-24 PROCEDURE — 83036 HEMOGLOBIN GLYCOSYLATED A1C: CPT | Performed by: NURSE PRACTITIONER

## 2023-04-24 PROCEDURE — 84145 PROCALCITONIN (PCT): CPT | Performed by: INTERNAL MEDICINE

## 2023-04-24 PROCEDURE — 25010000002 LEVETIRACETAM IN NACL 0.82% 500 MG/100ML SOLUTION

## 2023-04-24 RX ORDER — ATORVASTATIN CALCIUM 40 MG/1
80 TABLET, FILM COATED ORAL NIGHTLY
Status: DISCONTINUED | OUTPATIENT
Start: 2023-04-24 | End: 2023-04-27 | Stop reason: HOSPADM

## 2023-04-24 RX ORDER — KETOROLAC TROMETHAMINE 15 MG/ML
15 INJECTION, SOLUTION INTRAMUSCULAR; INTRAVENOUS EVERY 6 HOURS PRN
Status: DISPENSED | OUTPATIENT
Start: 2023-04-24 | End: 2023-04-25

## 2023-04-24 RX ORDER — ASPIRIN 81 MG/1
81 TABLET ORAL DAILY
Status: DISCONTINUED | OUTPATIENT
Start: 2023-04-24 | End: 2023-04-26

## 2023-04-24 RX ADMIN — ACETAMINOPHEN 325MG 650 MG: 325 TABLET ORAL at 12:07

## 2023-04-24 RX ADMIN — DEXAMETHASONE SODIUM PHOSPHATE 4 MG: 4 INJECTION INTRA-ARTICULAR; INTRALESIONAL; INTRAMUSCULAR; INTRAVENOUS; SOFT TISSUE at 15:04

## 2023-04-24 RX ADMIN — LEVETIRACETAM 500 MG: 5 INJECTION INTRAVASCULAR at 09:05

## 2023-04-24 RX ADMIN — ALPRAZOLAM 0.5 MG: 0.5 TABLET ORAL at 09:18

## 2023-04-24 RX ADMIN — Medication 1 PATCH: at 09:07

## 2023-04-24 RX ADMIN — ACETAMINOPHEN 325MG 650 MG: 325 TABLET ORAL at 06:37

## 2023-04-24 RX ADMIN — Medication 10 ML: at 09:06

## 2023-04-24 RX ADMIN — Medication 10 ML: at 21:07

## 2023-04-24 RX ADMIN — SODIUM CHLORIDE 100 ML/HR: 9 INJECTION, SOLUTION INTRAVENOUS at 16:23

## 2023-04-24 RX ADMIN — DEXAMETHASONE SODIUM PHOSPHATE 4 MG: 4 INJECTION INTRA-ARTICULAR; INTRALESIONAL; INTRAMUSCULAR; INTRAVENOUS; SOFT TISSUE at 09:05

## 2023-04-24 RX ADMIN — SODIUM CHLORIDE 100 ML/HR: 9 INJECTION, SOLUTION INTRAVENOUS at 06:45

## 2023-04-24 RX ADMIN — FAMOTIDINE 40 MG: 20 TABLET, FILM COATED ORAL at 09:05

## 2023-04-24 RX ADMIN — DEXAMETHASONE SODIUM PHOSPHATE 4 MG: 4 INJECTION INTRA-ARTICULAR; INTRALESIONAL; INTRAMUSCULAR; INTRAVENOUS; SOFT TISSUE at 04:59

## 2023-04-24 RX ADMIN — KETOROLAC TROMETHAMINE 15 MG: 15 INJECTION, SOLUTION INTRAMUSCULAR; INTRAVENOUS at 16:21

## 2023-04-24 RX ADMIN — ASPIRIN 81 MG: 81 TABLET, COATED ORAL at 16:21

## 2023-04-24 RX ADMIN — LEVETIRACETAM 500 MG: 5 INJECTION INTRAVASCULAR at 21:07

## 2023-04-24 RX ADMIN — ATORVASTATIN CALCIUM 80 MG: 40 TABLET, FILM COATED ORAL at 21:07

## 2023-04-24 NOTE — CONSULTS
Consults    Referring Provider: Magen DOWNING    Patient Care Team:  Ana Maria Garcia MD as PCP - General (Internal Medicine)  Janice Turner APRN as Referring Physician (Family Medicine)    Chief Complaint: Headache and ataxia    Subjective .     History of present illness:       Patient is a nice 58-year-old female who does not typically frequent the doctor.  Patient was seen and operated on by Dr. Aleman with a two-level anterior cervical fusion.    Patient is also worked up with Dr. Chiang for a suspicious bone lesion that was determined and deemed a hemangioma.     Patient went to outside hospital about 5 days ago with complaints of right-sided facial numbness and a bad headache.  Patient was discharged with a diagnosis of complex migraine.    Upon being admitted here the stroke team worked her up and discovered that she was having some face right upper extremity and right lower extremity numbness that is intermittent some occasional word finding difficulty, intermittent blurred vision along with some gait instability.  This has all come on over the last 4 days or so.    Patient was sent to the scanner for ruling out stroke and a occipital pole lesion was noted in the left side of the occipital lobe.  For this neurosurgery was consulted    Patient symptoms today are stable and mainly complaining of a headache.  Exam checks out for anything focal    Review of Systems   Constitutional: Negative for activity change, appetite change, chills, fatigue and fever.   HENT: Negative for congestion, dental problem, ear pain, hearing loss, sinus pressure and tinnitus.    Eyes: Negative for pain and redness.   Respiratory: Negative for apnea, cough, shortness of breath and wheezing.    Cardiovascular: Negative for chest pain, palpitations and leg swelling.   Gastrointestinal: Negative for abdominal distention, abdominal pain, blood in stool, constipation, diarrhea, nausea and vomiting.   Endocrine: Negative for cold  intolerance, heat intolerance and polyuria.   Genitourinary: Negative for enuresis, frequency and urgency.   Skin: Negative for color change and rash.   Neurological: Positive for headaches. Negative for dizziness, tremors, seizures, syncope, speech difficulty, weakness, light-headedness and numbness.   Psychiatric/Behavioral: Negative for behavioral problems and confusion. The patient is not nervous/anxious.        History  Past Medical History:   Diagnosis Date   • Arthritis    • Headache    • Migraines 4/23/2023   • Tobacco abuse 4/23/2023   ,   Past Surgical History:   Procedure Laterality Date   • BREAST EXCISIONAL BIOPSY Right     AGE 36   • NECK SURGERY      ACDF C4-5, C5-6 w/ Dr. Aleman   ,   Family History   Problem Relation Age of Onset   • Diabetes Mother    • Cancer Mother    • Stroke Mother    • Breast cancer Mother 74   • Heart disease Father    • Stroke Father    • Diabetes Father    • Cancer Father         lung cancer   • Cancer Son         liver cancer at age 10   • Breast cancer Maternal Aunt         DX AGE UNKNOWN   • Breast cancer Maternal Aunt         DX AGE UNKNOWN   • Breast cancer Maternal Aunt         DX AGE UNKNOWN   • Ovarian cancer Neg Hx    ,   Social History     Socioeconomic History   • Marital status:    Tobacco Use   • Smoking status: Every Day     Packs/day: 1.00     Years: 40.00     Pack years: 40.00     Types: Cigarettes   • Smokeless tobacco: Never   Vaping Use   • Vaping Use: Never used   Substance and Sexual Activity   • Alcohol use: Never   • Drug use: Never   • Sexual activity: Defer     E-cigarette/Vaping   • E-cigarette/Vaping Use Never User    • Passive Exposure No    • Counseling Given No      E-cigarette/Vaping Substances   • Nicotine No    • THC No    • CBD No    • Flavoring No      E-cigarette/Vaping Devices       ,   No medications prior to admission.   , Scheduled Meds:  dexamethasone, 4 mg, Intravenous, Q6H  famotidine, 40 mg, Oral, Daily  levETIRAcetam,  500 mg, Intravenous, Q12H  nicotine, 1 patch, Transdermal, Q24H  sodium chloride, 10 mL, Intravenous, Q12H    , Continuous Infusions:  sodium chloride, 100 mL/hr, Last Rate: 100 mL/hr (04/24/23 0855)    , PRN Meds:  •  acetaminophen **OR** acetaminophen **OR** acetaminophen  •  ALPRAZolam  •  senna-docusate sodium **AND** polyethylene glycol **AND** bisacodyl **AND** bisacodyl  •  sodium chloride  •  sodium chloride and Allergies:  Patient has no known allergies.   SMOKING STATUS: I spent greater than 10 minutes educating the patient on smoking cessation, and discussed how smoking pertains to the particular disease process at hand.  The patient acknowledges understanding.    Objective     Vital Signs   Temp:  [97.8 °F (36.6 °C)-98.8 °F (37.1 °C)] 97.8 °F (36.6 °C)  Heart Rate:  [67-79] 79  Resp:  [18] 18  BP: ()/(43-68) 121/66  Body mass index is 21.42 kg/m².    Physical Exam:     Body mass index is 21.42 kg/m².    GENERAL:           The patient is in no acute distress, and is able to answer all questions appropriately.    Neck:          Supple without lymphadenopathy    Cardiovascular:       Peripheral pulses 2+ at dorsalis pedis and posterior tibialis    Lungs:         Breathing unlabored    Musculoskeletal:            strength is 5 out of 5 bilaterally.        Shoulder abduction is 5 out of 5.         Dorsiflexion is 5/5 Bilaterally       Plantarflexion is 5/5 bilaterally       Hip Flexion 5/5 bilaterally.         The patient´s gait is normal without antalgia.    Neurologic:          The patient is alert and oriented by 3.          Pupils are equal and reactive to light.         Visual fields are full.         Extraocular movements are intact without nystagmus.         There is no evidence of central motor drift. No facial droop.  No difficulty with rapid alternating movements.         Sensation is equal bilaterally with no deficit.           Reflexes:  2+ through out    CRANIAL NERVES:         Cranial  nerve II: Visual fields are full to confrontation.       Cranial nerves III, IV and VI: PERRLA DC.  Extraocular movements are intact.  Nystagmus is not present.       Cranial nerve V: Facial sensation is intact to light touch.       Cranial nerve VII: Muscles of facial expression revealed no asymmetry.       Cranial nerve VIII: Hearing is intact to finger rub bilaterally.       Cranial nerve IX and X: Palate elevates symmetrically.        Cranial nerve XI: Shoulder shrug is intact.       Cranial nerve XII: Tongue is midline without evidence of Atrophy or fasciculation.  Results Review:   I reviewed the patient's new imaging results and agree with the interpretation.  Discussed with Dr. Javier.  This is a suspicious lesion that does not exactly fit the prototypical bill of a brain tumor.     Reviewed chest abdomen pelvis with Dr. Leiva and is negative for any pathology or primary source for cancer.    Assessment & Plan       Ataxia    Brain mass    Migraines    Tobacco abuse    Given the patient's absence of any obvious primary source we will look further into the brain specifically.  We will get a get a CT of the head with and without contrast as well as a CTA to ensure that this is not a vascular anomaly or any infectious process.    This is of low likelihood but given the interesting presentation we would like to rule these out.     Further recommendations will come after the scans are achieved.  Biopsy likely if we cannot find a other source.     I discussed the patients findings and my recommendations with patient and family    Henry Hagen PA-C  04/24/23  09:32 EDT    Time: 60 minutes

## 2023-04-24 NOTE — PLAN OF CARE
Goal Outcome Evaluation:  Plan of Care Reviewed With: patient        Progress: no change  Outcome Evaluation: PT eval completed. Pt ambulated 10+10+140 feet Min A x1 with dizziness and unsteadiness throughout with BP stable. Mobility limited d/t generalized weakness, dizziness, and fatigue. Pt demonstrated balance deficits, strength deficits, and decreased functional endurance warranting IPPT POC. d/c rec home with assist and HHPT. Rec trial of AD next PT session for increased stability.

## 2023-04-24 NOTE — PROGRESS NOTES
[facility] Medicine Services  PROGRESS NOTE    Patient Name: Charley Mckee  : 1965  MRN: [id number]    Date of Admission: 2023  Primary Care Physician: Eze Banerjee DO    Subjective   Subjective     CC:  stroke    HPI:  Patient complains of a continued headache today.    ROS:  Gen: no fever or chills  Pulm: no cough  GI: no nausea    Objective   Objective     Vital Signs:   Temp:  [97.2 °F (36.2 °C)-98.8 °F (37.1 °C)] 97.6 °F (36.4 °C)  Heart Rate:  [67-82] 79  Resp:  [18] 18  BP: ()/(47-68) 117/61     Physical Exam:  Constitutional - appears fatigued, in bed  HEENT-NCAT, mucous membranes moist  CV-RRR  Resp-CTAB  Abd-soft, nontender, nondistended, normoactive bowel sounds  Ext-No lower extremity cyanosis, clubbing or edema bilaterally  Neuro-somnolent, awakes to voice, speech clear, moves all extremities   Psych-flat affect   Skin- No rash on exposed UE or LE bilaterally      Results Reviewed:  LAB RESULTS:      Lab 23  0723  1123   WBC 27.13* 16.12*  --    HEMOGLOBIN 14.3 15.0  --    HEMATOCRIT 42.5 44.9  --    PLATELETS 393 417  --    NEUTROS ABS 25.01* 14.85*  --    IMMATURE GRANS (ABS) 0.26* 0.15*  --    LYMPHS ABS 1.48 1.02  --    MONOS ABS 0.34 0.03*  --    EOS ABS 0.00 0.00  --    MCV 99.5* 99.3*  --    CRP 0.73*  --   --    PROCALCITONIN 0.02  --   --    PROTIME  --   --  9.6         Lab 23  0723  1123   SODIUM 142 140  --    POTASSIUM 3.9 4.0  --    CHLORIDE 111* 109*  --    CO2 19.0* 19.0*  --    ANION GAP 12.0 12.0  --    BUN 7 8  --    CREATININE 0.60 0.63  --    EGFR 104.8 103.6  --    GLUCOSE 146* 162*  --    CALCIUM 8.9 9.5  --    MAGNESIUM  --   --  1.7*   HEMOGLOBIN A1C 5.20  --   --    TSH 0.834  --   --          Lab 23  1857   TOTAL PROTEIN 7.0   ALBUMIN 4.4   GLOBULIN 2.6   ALT (SGPT) 7   AST (SGOT) 14   BILIRUBIN 0.2   ALK PHOS 129*         Lab 23  1123    PROTIME 9.6   INR 0.91                 Brief Urine Lab Results     None          Microbiology Results Abnormal     None          CT Chest With Contrast Diagnostic    Result Date: 4/24/2023  CT CHEST W CONTRAST DIAGNOSTIC, CT ABDOMEN PELVIS W CONTRAST Date of Exam: 4/23/2023 10:09 PM EDT Indication: eval for malignancy. Comparison: PET/CT from May 23, 2018 Technique: Axial CT images were obtained of the chest abdomen and pelvis after the uneventful intravenous administration of 80 mL Isovue-300.  Reconstructed coronal and sagittal images were also obtained. Automated exposure control and iterative construction methods were used. Findings: Chest: The central tracheobronchial tree is clear. There is mild centrilobular emphysema. Multiple benign calcified granulomas are noted. No concerning pulmonary nodule is identified. There is no focal consolidation. There is no pleural effusion. The heart size appears normal. The great vessels are normal in caliber. No abnormally enlarged lymph nodes are identified. No aggressive osseous lesions are identified. Abdomen/pelvis: The liver, gallbladder, adrenal glands, kidneys, spleen, and pancreas are unremarkable. The stomach appears normal. The small bowel appears normal in caliber and configuration. The colon appears normal. The appendix appears normal. There is no ascites or loculated collection. No abnormally enlarged lymph nodes are identified. The rectum, uterus and adnexa, and urinary bladder are unremarkable. No aggressive osseous lesions are identified.     Impression: Impression: 1.No evidence of malignancy within chest/abdomen/pelvis. 2.No acute process identified. 3.Mild emphysema. Electronically Signed: Savage Gill  4/24/2023 9:53 AM EDT  Workstation ID: [id number]    CT Abdomen Pelvis With Contrast    Result Date: 4/24/2023  CT CHEST W CONTRAST DIAGNOSTIC, CT ABDOMEN PELVIS W CONTRAST Date of Exam: 4/23/2023 10:09 PM EDT Indication: eval for malignancy. Comparison:  PET/CT from May 23, 2018 Technique: Axial CT images were obtained of the chest abdomen and pelvis after the uneventful intravenous administration of 80 mL Isovue-300.  Reconstructed coronal and sagittal images were also obtained. Automated exposure control and iterative construction methods were used. Findings: Chest: The central tracheobronchial tree is clear. There is mild centrilobular emphysema. Multiple benign calcified granulomas are noted. No concerning pulmonary nodule is identified. There is no focal consolidation. There is no pleural effusion. The heart size appears normal. The great vessels are normal in caliber. No abnormally enlarged lymph nodes are identified. No aggressive osseous lesions are identified. Abdomen/pelvis: The liver, gallbladder, adrenal glands, kidneys, spleen, and pancreas are unremarkable. The stomach appears normal. The small bowel appears normal in caliber and configuration. The colon appears normal. The appendix appears normal. There is no ascites or loculated collection. No abnormally enlarged lymph nodes are identified. The rectum, uterus and adnexa, and urinary bladder are unremarkable. No aggressive osseous lesions are identified.     Impression: Impression: 1.No evidence of malignancy within chest/abdomen/pelvis. 2.No acute process identified. 3.Mild emphysema. Electronically Signed: Svaage Gill  4/24/2023 9:53 AM EDT  Workstation ID: [id number]    XR Chest 1 View    Result Date: 4/24/2023  XR CHEST 1 VW Date of Exam: 4/24/2023 8:29 AM EDT Indication: leukocytosis Comparison: CT chest April 23, 2023 FINDINGS: No definite focal or diffuse pulmonary infiltrate is identified. Multiple calcified granulomas are again seen. There are findings of emphysema, as seen on the prior CT. No pneumothorax or significant pleural effusion.  Heart size and mediastinal contour appear within normal limits. Calcification adjacent to the lateral right humeral head may represent calcific  tendinopathy of the rotator cuff. There is slight right convex curvature of the thoracic spine.     Impression: 1.Emphysema. 2.No definite radiographic findings of acute cardiopulmonary abnormality. Electronically Signed: Doug Moeller  4/24/2023 12:03 PM EDT  Workstation ID: [id number]    MRI Outside Films    Result Date: 4/23/2023  This procedure was auto-finalized with no dictation required.    CT Outside Films    Result Date: 4/23/2023  This procedure was auto-finalized with no dictation required.    CT Outside Films    Result Date: 4/23/2023  This procedure was auto-finalized with no dictation required.          Current medications:  Scheduled Meds:aspirin, 81 mg, Oral, Daily  dexamethasone, 4 mg, Intravenous, Q6H  famotidine, 40 mg, Oral, Daily  levETIRAcetam, 500 mg, Intravenous, Q12H  nicotine, 1 patch, Transdermal, Q24H  sodium chloride, 10 mL, Intravenous, Q12H      Continuous Infusions:sodium chloride, 100 mL/hr, Last Rate: 100 mL/hr (04/24/23 1623)      PRN Meds:.•  acetaminophen **OR** acetaminophen **OR** acetaminophen  •  ALPRAZolam  •  senna-docusate sodium **AND** polyethylene glycol **AND** bisacodyl **AND** bisacodyl  •  ketorolac  •  sodium chloride  •  sodium chloride    Assessment & Plan   Assessment & Plan     Active Hospital Problems    Diagnosis  POA   • **Ataxia [R27.0]  Yes   • Brain mass [G93.89]  Unknown   • Migraines [G43.909]  Unknown   • Tobacco abuse [Z72.0]  Unknown      Resolved Hospital Problems   No resolved problems to display.        Brief Hospital Course to date:  Charley Mckee is a 58 y.o. female with history of tobacco abuse and possible bone lesions presents with headaches, short term memory deficits, vision loss and right sided paresthesias.    Probable subacute stroke  - initial concern for brain mass, however neurosurgery favors subacute stroke  - asa, high intensity statin  - discontinue dexamethasone  - check full lipid panel  - echo with bubble  - PT/OT  -  discussed with Neurosurgery Dr Javier and the Neurology stroke team today -- Neurology to continue to follow.  - follow up with Neurosurgery in 3-4 weeks with MRI w/wo  - will likely need followup with stroke neurology as well.    Tobacco abuse  -  cessation, nicotine replacement    Leukocytosis  - suspect secondary to steroids  - no abnormality on CT chest abdomen and pelvis  - afebrile, procalcitonin low  - check UA  - cbc am    Expected Discharge Location and Transportation: home  Expected Discharge   Expected Discharge Date and Time     Expected Discharge Date Expected Discharge Time    Apr 25, 2023            DVT prophylaxis:  Mechanical DVT prophylaxis orders are present.     AM-PAC 6 Clicks Score (PT): 20 (04/24/23 0918)    CODE STATUS:   Code Status and Medical Interventions:   Ordered at: 04/23/23 1831     Level Of Support Discussed With:    Patient     Code Status (Patient has no pulse and is not breathing):    CPR (Attempt to Resuscitate)     Medical Interventions (Patient has pulse or is breathing):    Full Support       Radu Us MD  04/24/23

## 2023-04-24 NOTE — THERAPY EVALUATION
Patient Name: Charley Mckee  : 1965    MRN: [id number]                              Today's Date: 2023       Admit Date: 2023    Visit Dx: No diagnosis found.  Patient Active Problem List   Diagnosis   • Cervical stenosis of spinal canal   • Bone mass   • Ataxia   • Brain mass   • Migraines   • Tobacco abuse     Past Medical History:   Diagnosis Date   • Arthritis    • Headache    • Migraines 2023   • Tobacco abuse 2023     Past Surgical History:   Procedure Laterality Date   • BREAST EXCISIONAL BIOPSY Right     AGE 36   • NECK SURGERY      ACDF C4-5, C5-6 w/ Dr. Aleman      General Information     Row Name 23 0942          Physical Therapy Time and Intention    Document Type evaluation  -     Mode of Treatment physical therapy  -     Row Name 23 0942          General Information    Patient Profile Reviewed yes  -     Prior Level of Function independent:;all household mobility;community mobility;gait;transfer;bed mobility  no AD at baseline  -     Existing Precautions/Restrictions fall  -     Barriers to Rehab none identified  -     Row Name 23 0942          Living Environment    People in Home spouse  -     Row Name 23 0942          Home Main Entrance    Number of Stairs, Main Entrance none  -     Row Name 23 0942          Stairs Within Home, Primary    Number of Stairs, Within Home, Primary none  -     Row Name 23 0942          Safety Issues, Functional Mobility    Safety Issues Affecting Function (Mobility) safety precaution awareness  -     Impairments Affecting Function (Mobility) balance;strength;pain;endurance/activity tolerance  -           User Key  (r) = Recorded By, (t) = Taken By, (c) = Cosigned By    Initials Name Provider Type     Mallory Yan PT Physical Therapist               Mobility     Row Name 23 0943          Bed Mobility    Bed Mobility supine-sit;sit-supine  -     Supine-Sit  [place] (Bed Mobility) standby assist  -HM     Sit-Supine [place] (Bed Mobility) standby assist  -     Assistive Device (Bed Mobility) head of bed elevated  -     Row Name 04/24/23 0943          Sit-Stand Transfer    Sit-Stand Allison (Transfers) contact guard  -     Comment, (Sit-Stand Transfer) x2 from bed, x1 from toilet  -     Row Name 04/24/23 0943          Gait/Stairs (Locomotion)    [place] Level (Gait) minimum assist (75% patient effort);1 person assist  -     Assistive Device (Gait) other (see comments)  no AD to bathroom, IV pole for hallway ambulation  -     Distance in Feet (Gait) 10+10+140  -HM     Deviations/Abnormal Patterns (Gait) bilateral deviations;stride length decreased;joe decreased;gait speed decreased  -     Comment, (Gait/Stairs) pt ambulated 10+10 feet to bathroom with no AD with unsteadiness throughout. Pt stated dizziness and returned to sitting EOB and donned glasses stating an improvement in dizziness with BP stable. Pt ambulated 140 feet with UE support on IV pole for additional stability with decreased stride length, decreased gait speed, and decreased joe. Mobility limited d/t fatigue, dizziness, and generalized weakness. Rec trial of AD next PT session for increased stability.  -           User Key  (r) = Recorded By, (t) = Taken By, (c) = Cosigned By    Initials Name Provider Type     Mallory Yan, COLBY Physical Therapist               Obj/Interventions     Row Name 04/24/23 0949          Range of Motion Comprehensive    General Range of Motion bilateral lower extremity ROM WFL  -     Row Name 04/24/23 0949          Strength Comprehensive (MMT)    General Manual Muscle Testing (MMT) Assessment lower extremity strength deficits identified  -     Comment, General Manual Muscle Testing (MMT) Assessment LLE 4+/5, RLE 4-/5  -     Row Name 04/24/23 0949          Motor Skills    Motor Skills coordination  -     Coordination WFL;heel  to shin  -     Row Name 04/24/23 0949          Balance    Balance Assessment sitting static balance;sitting dynamic balance;sit to stand dynamic balance;standing static balance;standing dynamic balance  -     Static Sitting Balance standby assist  -     Dynamic Sitting Balance standby assist  -HM     Position, Sitting Balance unsupported;sitting edge of bed  -     Sit to Stand Dynamic Balance contact guard  -     Static Standing Balance contact guard;1-person assist  -     Dynamic Standing Balance minimal assist;1-person assist  -HM     Position/Device Used, Standing Balance supported  -     Balance Interventions standing;dynamic;occupation based/functional task  -     Comment, Balance no LOB, unsteadiness throughout.  -     Row Name 04/24/23 0949          Sensory Assessment (Somatosensory)    Sensory Assessment (Somatosensory) LE sensation intact  -           User Key  (r) = Recorded By, (t) = Taken By, (c) = Cosigned By    Initials Name Provider Type     Mallory Yan, PT Physical Therapist               Goals/Plan     Row Name 04/24/23 0957          Bed Mobility Goal 1 (PT)    Activity/Assistive Device (Bed Mobility Goal 1, PT) bed mobility activities, all  -HM     [place] Level/Cues Needed (Bed Mobility Goal 1, PT) standby assist  -HM     Time Frame (Bed Mobility Goal 1, PT) long term goal (LTG);10 days  -     Progress/Outcomes (Bed Mobility Goal 1, PT) new goal  -     Row Name 04/24/23 0957          Transfer Goal 1 (PT)    Activity/Assistive Device (Transfer Goal 1, PT) sit-to-stand/stand-to-sit;bed-to-chair/chair-to-bed  -HM     [place] Level/Cues Needed (Transfer Goal 1, PT) standby assist  -     Time Frame (Transfer Goal 1, PT) long term goal (LTG);10 days  -     Progress/Outcome (Transfer Goal 1, PT) new goal  -     Row Name 04/24/23 0957          Gait Training Goal 1 (PT)    Activity/Assistive Device (Gait Training Goal 1, PT) gait (walking locomotion)  -      [place] Level (Gait Training Goal 1, PT) standby assist  -HM     Distance (Gait Training Goal 1, PT) 450  -HM     Time Frame (Gait Training Goal 1, PT) long term goal (LTG);10 days  -     Progress/Outcome (Gait Training Goal 1, PT) new goal  -     Row Name 04/24/23 0957          Therapy Assessment/Plan (PT)    Planned Therapy Interventions (PT) balance training;bed mobility training;gait training;home exercise program;neuromuscular re-education;manual therapy techniques;patient/family education;postural re-education;ROM (range of motion);strengthening;transfer training  -           User Key  (r) = Recorded By, (t) = Taken By, (c) = Cosigned By    Initials Name Provider Type     Mallory Yan, PT Physical Therapist               Clinical Impression     Row Name 04/24/23 0953          Pain    Additional Documentation Pain Scale: FACES Pre/Post-Treatment (Group)  -     Row Name 04/24/23 0953          Pain Scale: FACES Pre/Post-Treatment    Pain: FACES Scale, Pretreatment 2-->hurts little bit  -     Posttreatment Pain Rating 2-->hurts little bit  -     Pain Location generalized  -     Pain Location - head  -     Pre/Posttreatment Pain Comment tolerated  -     Row Name 04/24/23 0953          Plan of Care Review    Plan of Care Reviewed With patient  -     Progress no change  -     Outcome Evaluation PT eval completed. Pt ambulated 10+10+140 feet Min A x1 with dizziness and unsteadiness throughout with BP stable. Mobility limited d/t generalized weakness, dizziness, and fatigue. Pt demonstrated balance deficits, strength deficits, and decreased functional endurance warranting IPPT POC. d/c rec home with assist and HHPT. Rec trial of AD next PT session for increased stability.  -     Row Name 04/24/23 0953          Therapy Assessment/Plan (PT)    Rehab Potential (PT) good, to achieve stated therapy goals  -     Criteria for Skilled Interventions Met (PT) yes;meets criteria;skilled  treatment is necessary  -     Therapy Frequency (PT) daily  -     Row Name 04/24/23 0953          Vital Signs    Pre Systolic BP Rehab 121  -HM     Pre Treatment Diastolic BP 66  -HM     Intra Systolic BP Rehab 129  sitting EOB  -HM     Intra Treatment Diastolic BP 69  -HM     Post Systolic BP Rehab 131  -HM     Post Treatment Diastolic BP 66  -HM     Pretreatment Heart Rate (beats/min) 84  -HM     Posttreatment Heart Rate (beats/min) 78  -HM     Pre SpO2 (%) 97  -HM     O2 Delivery Pre Treatment room air  -HM     O2 Delivery Intra Treatment room air  -HM     Post SpO2 (%) 98  -HM     O2 Delivery Post Treatment room air  -HM     Pre Patient Position Supine  -HM     Intra Patient Position Standing  -HM     Post Patient Position Supine  -HM     Row Name 04/24/23 0953          Positioning and Restraints    Pre-Treatment Position in bed  -     Post Treatment Position bed  -HM     In Bed notified nsg;encouraged to call for assist;call light within reach;exit alarm on;supine;with family/caregiver  seizure precautions  -           User Key  (r) = Recorded By, (t) = Taken By, (c) = Cosigned By    Initials Name Provider Type    Mallory [place], PT Physical Therapist               Outcome Measures     Row Name 04/24/23 0958 04/24/23 0800       How much help from another person do you currently need...    Turning from your back to your side while in flat bed without using bedrails? 4  - 4  -DW    Moving from lying on back to sitting on the side of a flat bed without bedrails? 4  - 4  -DW    Moving to and from a bed to a chair (including a wheelchair)? 3  - 4  -DW    Standing up from a chair using your arms (e.g., wheelchair, bedside chair)? 3  - 4  -DW    Climbing 3-5 steps with a railing? 3  - 4  -DW    To walk in hospital room? 3  - 4  -DW    AM-PAC 6 Clicks Score (PT) 20  - 24  -DW    Highest level of mobility 6 --> Walked 10 steps or more  - 8 --> Walked 250 feet or more  -DW    Row Name  04/24/23 0958          Functional Assessment    Outcome Measure Options AM-PAC 6 Clicks Basic Mobility (PT)  -           User Key  (r) = Recorded By, (t) = Taken By, (c) = Cosigned By    Initials Name Provider Type    Sirisha Lr, RN Registered Nurse     Mallory Yan PT Physical Therapist                             Physical Therapy Education     Title: PT OT SLP Therapies (In Progress)     Topic: Physical Therapy (In Progress)     Point: Mobility training (Done)     Learning Progress Summary           Patient Acceptance, E,TB, VU,NR by  at 4/24/2023 0958                   Point: Home exercise program (Not Started)     Learner Progress:  Not documented in this visit.          Point: Body mechanics (Done)     Learning Progress Summary           Patient Acceptance, E,TB, VU,NR by  at 4/24/2023 0958                   Point: Precautions (Done)     Learning Progress Summary           Patient Acceptance, E,TB, VU,NR by  at 4/24/2023 0958                               User Key     Initials Effective Dates Name Provider Type Discipline     09/22/22 -  Mallory Yan PT Physical Therapist PT              PT Recommendation and Plan  Planned Therapy Interventions (PT): balance training, bed mobility training, gait training, home exercise program, neuromuscular re-education, manual therapy techniques, patient/family education, postural re-education, ROM (range of motion), strengthening, transfer training  Plan of Care Reviewed With: patient  Progress: no change  Outcome Evaluation: PT eval completed. Pt ambulated 10+10+140 feet Min A x1 with dizziness and unsteadiness throughout with BP stable. Mobility limited d/t generalized weakness, dizziness, and fatigue. Pt demonstrated balance deficits, strength deficits, and decreased functional endurance warranting IPPT POC. d/c rec home with assist and HHPT. Rec trial of AD next PT session for increased stability.     Time Calculation:    PT Charges     Row  Name 04/24/23 0959             Time Calculation    Start Time 0920  -HM      PT Received On 04/24/23  -HM      PT Goal Re-Cert Due Date 05/04/23  -HM         Untimed Charges    PT Eval/Re-eval Minutes 48  -HM         Total Minutes    Untimed Charges Total Minutes 48  -HM       Total Minutes 48  -HM            User Key  (r) = Recorded By, (t) = Taken By, (c) = Cosigned By    Initials Name Provider Type     Mallory Yan, PT Physical Therapist              Therapy Charges for Today     Code Description Service Date Service Provider Modifiers Qty    [id number] HC PT EVAL LOW COMPLEXITY 4 4/24/2023 Mallory Yan, PT GP 1          PT G-Codes  Outcome Measure Options: AM-PAC 6 Clicks Basic Mobility (PT)  AM-PAC 6 Clicks Score (PT): 20  PT Discharge Summary  Anticipated Discharge Disposition (PT): home with assist, home with home health    Mallory Yan, PT  4/24/2023

## 2023-04-24 NOTE — CASE MANAGEMENT/SOCIAL WORK
Discharge Planning Assessment  [facility]     Patient Name: Charley Mckee  MRN: [id number]  Today's Date: 4/24/2023    Admit Date: 4/23/2023    Plan: Home with family   Discharge Needs Assessment     Row Name 04/24/23 0843       Living Environment    People in Home spouse    Current Living Arrangements home    Potentially Unsafe Housing Conditions none    Primary Care Provided by self    Provides Primary Care For no one    Family Caregiver if Needed spouse;parent(s)    Family Caregiver Names Shaunna Soni (mother) [phone]    Able to Return to Prior Arrangements yes       Resource/Environmental Concerns    Resource/Environmental Concerns none    Transportation Concerns none       Transition Planning    Patient/Family Anticipates Transition to home with family    Patient/Family Anticipated Services at Transition none    Transportation Anticipated family or friend will provide       Discharge Needs Assessment    Readmission Within the Last 30 Days no previous admission in last 30 days    Equipment Currently Used at Home none    Concerns to be Addressed denies needs/concerns at this time    Anticipated Changes Related to Illness none    Equipment Needed After Discharge none               Discharge Plan     Row Name 04/24/23 0844       Plan    Plan Home with family    Patient/Family in Agreement with Plan yes    Plan Comments Spoke with patient at bedside. Lives with spouse in [place] Contact is Shaunna Soni (mother) [phone]. Is independent with ADL's. No problems with [place]icaid pending or medications. Uses no DME at home. No advanced directives. PCP is Dr. Banerjee. Plan is home with spouse. CM will continue to follow.    Final Discharge Disposition Code 01 - home or self-care              Continued Care and Services - Admitted Since 4/23/2023    Coordination has not been started for this encounter.       Expected Discharge Date and Time     Expected Discharge Date Expected Discharge Time    Apr  27, 2023          Demographic Summary     Row Name 04/24/23 0842       General Information    Admission Type observation    Arrived From hospital    Referral Source admission list    Reason for Consult discharge planning    Preferred Language English       Contact Information    Permission Granted to Share Info With     Contact Information Obtained for                Functional Status     Row Name 04/24/23 0843       Functional Status    Usual Activity Tolerance good    Current Activity Tolerance good       Functional Status, IADL    Medications independent    Meal Preparation independent    Housekeeping independent    Laundry independent    Shopping independent       Mental Status    General Appearance WDL WDL       Mental Status Summary    Recent Changes in Mental Status/Cognitive Functioning no changes       Employment/    Employment Status employed full-time               Psychosocial    No documentation.                Abuse/Neglect    No documentation.                Legal    No documentation.                Substance Abuse    No documentation.                Patient Forms    No documentation.                   Trent Aguiar RN

## 2023-04-25 ENCOUNTER — APPOINTMENT (OUTPATIENT)
Dept: CARDIOLOGY | Facility: HOSPITAL | Age: 58
DRG: 66 | End: 2023-04-25

## 2023-04-25 ENCOUNTER — TELEPHONE (OUTPATIENT)
Dept: NEUROSURGERY | Facility: CLINIC | Age: 58
End: 2023-04-25

## 2023-04-25 DIAGNOSIS — G93.89 BRAIN MASS: Primary | ICD-10-CM

## 2023-04-25 LAB
ANION GAP SERPL CALCULATED.3IONS-SCNC: 9 MMOL/L (ref 5–15)
BASOPHILS # BLD AUTO: 0.08 10*3/MM3 (ref 0–0.2)
BASOPHILS NFR BLD AUTO: 0.2 % (ref 0–1.5)
BH CV ECHO MEAS - AO MAX PG: 8.4 MMHG
BH CV ECHO MEAS - AO MEAN PG: 5 MMHG
BH CV ECHO MEAS - AO ROOT DIAM: 2.5 CM
BH CV ECHO MEAS - AO V2 MAX: 145 CM/SEC
BH CV ECHO MEAS - AO V2 VTI: 32.5 CM
BH CV ECHO MEAS - AVA(I,D): 2.6 CM2
BH CV ECHO MEAS - EDV(CUBED): 85.2 ML
BH CV ECHO MEAS - EDV(MOD-SP2): 62.5 ML
BH CV ECHO MEAS - EDV(MOD-SP4): 82.2 ML
BH CV ECHO MEAS - EF(MOD-BP): 58.8 %
BH CV ECHO MEAS - EF(MOD-SP2): 57.1 %
BH CV ECHO MEAS - EF(MOD-SP4): 59.5 %
BH CV ECHO MEAS - ESV(CUBED): 22 ML
BH CV ECHO MEAS - ESV(MOD-SP2): 26.8 ML
BH CV ECHO MEAS - ESV(MOD-SP4): 33.3 ML
BH CV ECHO MEAS - FS: 36.4 %
BH CV ECHO MEAS - IVS/LVPW: 0.9 CM
BH CV ECHO MEAS - IVSD: 0.9 CM
BH CV ECHO MEAS - LA DIMENSION: 3.5 CM
BH CV ECHO MEAS - LAT PEAK E' VEL: 11.2 CM/SEC
BH CV ECHO MEAS - LV DIASTOLIC VOL/BSA (35-75): 55.5 CM2
BH CV ECHO MEAS - LV MASS(C)D: 137.8 GRAMS
BH CV ECHO MEAS - LV MAX PG: 5.6 MMHG
BH CV ECHO MEAS - LV MEAN PG: 3 MMHG
BH CV ECHO MEAS - LV SYSTOLIC VOL/BSA (12-30): 22.5 CM2
BH CV ECHO MEAS - LV V1 MAX: 118 CM/SEC
BH CV ECHO MEAS - LV V1 VTI: 26.5 CM
BH CV ECHO MEAS - LVIDD: 4.4 CM
BH CV ECHO MEAS - LVIDS: 2.8 CM
BH CV ECHO MEAS - LVOT AREA: 3.1 CM2
BH CV ECHO MEAS - LVOT DIAM: 2 CM
BH CV ECHO MEAS - LVPWD: 1 CM
BH CV ECHO MEAS - MED PEAK E' VEL: 8.1 CM/SEC
BH CV ECHO MEAS - MV A MAX VEL: 106 CM/SEC
BH CV ECHO MEAS - MV DEC SLOPE: 627 CM/SEC2
BH CV ECHO MEAS - MV DEC TIME: 0.15 MSEC
BH CV ECHO MEAS - MV E MAX VEL: 92.1 CM/SEC
BH CV ECHO MEAS - MV E/A: 0.87
BH CV ECHO MEAS - PA ACC TIME: 0.07 SEC
BH CV ECHO MEAS - PA PR(ACCEL): 48.8 MMHG
BH CV ECHO MEAS - PI END-D VEL: 125 CM/SEC
BH CV ECHO MEAS - RAP SYSTOLE: 3 MMHG
BH CV ECHO MEAS - RVSP: 19 MMHG
BH CV ECHO MEAS - SI(MOD-SP2): 24.1 ML/M2
BH CV ECHO MEAS - SI(MOD-SP4): 33 ML/M2
BH CV ECHO MEAS - SV(LVOT): 83.3 ML
BH CV ECHO MEAS - SV(MOD-SP2): 35.7 ML
BH CV ECHO MEAS - SV(MOD-SP4): 48.9 ML
BH CV ECHO MEAS - TAPSE (>1.6): 1.85 CM
BH CV ECHO MEAS - TR MAX PG: 16.3 MMHG
BH CV ECHO MEAS - TR MAX VEL: 202 CM/SEC
BH CV ECHO MEASUREMENTS AVERAGE E/E' RATIO: 9.54
BH CV ECHO SHUNT ASSESSMENT PERFORMED (HIDDEN SCRIPTING): 1
BH CV VAS BP LEFT ARM: NORMAL MMHG
BH CV VAS TCD LEFT DISTAL M1: 24.7 CM/SEC
BH CV VAS TCD LEFT MID M1: 36.87 CM/SEC
BH CV VAS TCD LEFT PROXIMAL M1: 32.97 CM/SEC
BH CV VAS TCD LEFT TERMINAL ICA: 23.35 CM/SEC
BH CV VAS TCD RIGHT DISTAL M1: 31.21 CM/SEC
BH CV VAS TCD RIGHT MID M1: 25.26 CM/SEC
BH CV VAS TCD RIGHT PROXIMAL M1: 34.82 CM/SEC
BH CV VAS TCD RIGHT TERMINAL ICA: 12.33 CM/SEC
BH CV XLRA - RV BASE: 3.5 CM
BH CV XLRA - RV LENGTH: 7.3 CM
BH CV XLRA - RV MID: 2.4 CM
BH CV XLRA - TDI S': 11.2 CM/SEC
BUN SERPL-MCNC: 13 MG/DL (ref 6–20)
BUN/CREAT SERPL: 25 (ref 7–25)
CALCIUM SPEC-SCNC: 8.9 MG/DL (ref 8.6–10.5)
CHLORIDE SERPL-SCNC: 114 MMOL/L (ref 98–107)
CHOLEST SERPL-MCNC: 239 MG/DL (ref 0–200)
CO2 SERPL-SCNC: 22 MMOL/L (ref 22–29)
CREAT SERPL-MCNC: 0.52 MG/DL (ref 0.57–1)
D-LACTATE SERPL-SCNC: 1.9 MMOL/L (ref 0.5–2)
DEPRECATED RDW RBC AUTO: 52.6 FL (ref 37–54)
EGFRCR SERPLBLD CKD-EPI 2021: 108.5 ML/MIN/1.73
EOSINOPHIL # BLD AUTO: 0.01 10*3/MM3 (ref 0–0.4)
EOSINOPHIL NFR BLD AUTO: 0 % (ref 0.3–6.2)
ERYTHROCYTE [DISTWIDTH] IN BLOOD BY AUTOMATED COUNT: 14.3 % (ref 12.3–15.4)
GLUCOSE SERPL-MCNC: 92 MG/DL (ref 65–99)
HCT VFR BLD AUTO: 37.8 % (ref 34–46.6)
HDLC SERPL-MCNC: 36 MG/DL (ref 40–60)
HGB BLD-MCNC: 12.6 G/DL (ref 12–15.9)
IMM GRANULOCYTES # BLD AUTO: 0.98 10*3/MM3 (ref 0–0.05)
IMM GRANULOCYTES NFR BLD AUTO: 2.4 % (ref 0–0.5)
LDLC SERPL CALC-MCNC: 164 MG/DL (ref 0–100)
LDLC/HDLC SERPL: 4.48 {RATIO}
LEFT ATRIUM VOLUME INDEX: 20.6 ML/M2
LYMPHOCYTES # BLD AUTO: 2.65 10*3/MM3 (ref 0.7–3.1)
LYMPHOCYTES NFR BLD AUTO: 6.5 % (ref 19.6–45.3)
MACROCYTES BLD QL SMEAR: NORMAL
MAXIMAL PREDICTED HEART RATE: 163 BPM
MAXIMAL PREDICTED HEART RATE: 163 BPM
MCH RBC QN AUTO: 33.5 PG (ref 26.6–33)
MCHC RBC AUTO-ENTMCNC: 33.3 G/DL (ref 31.5–35.7)
MCV RBC AUTO: 100.5 FL (ref 79–97)
MONOCYTES # BLD AUTO: 1.88 10*3/MM3 (ref 0.1–0.9)
MONOCYTES NFR BLD AUTO: 4.6 % (ref 5–12)
NEUTROPHILS NFR BLD AUTO: 35.24 10*3/MM3 (ref 1.7–7)
NEUTROPHILS NFR BLD AUTO: 86.3 % (ref 42.7–76)
NRBC BLD AUTO-RTO: 0 /100 WBC (ref 0–0.2)
PLAT MORPH BLD: NORMAL
PLATELET # BLD AUTO: 373 10*3/MM3 (ref 140–450)
PMV BLD AUTO: 11.2 FL (ref 6–12)
POTASSIUM SERPL-SCNC: 4 MMOL/L (ref 3.5–5.2)
PROCALCITONIN SERPL-MCNC: 0.03 NG/ML (ref 0–0.25)
QT INTERVAL: 362 MS
QTC INTERVAL: 420 MS
RBC # BLD AUTO: 3.76 10*6/MM3 (ref 3.77–5.28)
SODIUM SERPL-SCNC: 145 MMOL/L (ref 136–145)
STRESS TARGET HR: 139 BPM
STRESS TARGET HR: 139 BPM
TRIGL SERPL-MCNC: 208 MG/DL (ref 0–150)
VLDLC SERPL-MCNC: 39 MG/DL (ref 5–40)
WBC MORPH BLD: NORMAL
WBC NRBC COR # BLD: 40.84 10*3/MM3 (ref 3.4–10.8)

## 2023-04-25 PROCEDURE — 87040 BLOOD CULTURE FOR BACTERIA: CPT | Performed by: STUDENT IN AN ORGANIZED HEALTH CARE EDUCATION/TRAINING PROGRAM

## 2023-04-25 PROCEDURE — 83605 ASSAY OF LACTIC ACID: CPT | Performed by: PHYSICIAN ASSISTANT

## 2023-04-25 PROCEDURE — 25010000002 KETOROLAC TROMETHAMINE PER 15 MG: Performed by: INTERNAL MEDICINE

## 2023-04-25 PROCEDURE — 80048 BASIC METABOLIC PNL TOTAL CA: CPT | Performed by: INTERNAL MEDICINE

## 2023-04-25 PROCEDURE — 97530 THERAPEUTIC ACTIVITIES: CPT

## 2023-04-25 PROCEDURE — 97165 OT EVAL LOW COMPLEX 30 MIN: CPT

## 2023-04-25 PROCEDURE — 97535 SELF CARE MNGMENT TRAINING: CPT

## 2023-04-25 PROCEDURE — 80061 LIPID PANEL: CPT | Performed by: INTERNAL MEDICINE

## 2023-04-25 PROCEDURE — 25010000002 LEVETIRACETAM IN NACL 0.82% 500 MG/100ML SOLUTION

## 2023-04-25 PROCEDURE — 99232 SBSQ HOSP IP/OBS MODERATE 35: CPT | Performed by: STUDENT IN AN ORGANIZED HEALTH CARE EDUCATION/TRAINING PROGRAM

## 2023-04-25 PROCEDURE — 93306 TTE W/DOPPLER COMPLETE: CPT | Performed by: INTERNAL MEDICINE

## 2023-04-25 PROCEDURE — 85007 BL SMEAR W/DIFF WBC COUNT: CPT | Performed by: INTERNAL MEDICINE

## 2023-04-25 PROCEDURE — 93893 TCD STD ICR ART VEN-ART SHNT: CPT

## 2023-04-25 PROCEDURE — 25010000002 CEFTRIAXONE PER 250 MG: Performed by: STUDENT IN AN ORGANIZED HEALTH CARE EDUCATION/TRAINING PROGRAM

## 2023-04-25 PROCEDURE — 93306 TTE W/DOPPLER COMPLETE: CPT

## 2023-04-25 PROCEDURE — 99233 SBSQ HOSP IP/OBS HIGH 50: CPT | Performed by: STUDENT IN AN ORGANIZED HEALTH CARE EDUCATION/TRAINING PROGRAM

## 2023-04-25 PROCEDURE — 84145 PROCALCITONIN (PCT): CPT | Performed by: PHYSICIAN ASSISTANT

## 2023-04-25 PROCEDURE — 85025 COMPLETE CBC W/AUTO DIFF WBC: CPT | Performed by: INTERNAL MEDICINE

## 2023-04-25 RX ADMIN — KETOROLAC TROMETHAMINE 15 MG: 15 INJECTION, SOLUTION INTRAMUSCULAR; INTRAVENOUS at 12:55

## 2023-04-25 RX ADMIN — Medication 1 PATCH: at 08:55

## 2023-04-25 RX ADMIN — LEVETIRACETAM 500 MG: 5 INJECTION INTRAVASCULAR at 08:58

## 2023-04-25 RX ADMIN — Medication 10 ML: at 08:55

## 2023-04-25 RX ADMIN — ALPRAZOLAM 0.5 MG: 0.5 TABLET ORAL at 09:02

## 2023-04-25 RX ADMIN — LEVETIRACETAM 500 MG: 5 INJECTION INTRAVASCULAR at 21:31

## 2023-04-25 RX ADMIN — SODIUM CHLORIDE 100 ML/HR: 9 INJECTION, SOLUTION INTRAVENOUS at 01:59

## 2023-04-25 RX ADMIN — ATORVASTATIN CALCIUM 80 MG: 40 TABLET, FILM COATED ORAL at 21:32

## 2023-04-25 RX ADMIN — Medication 10 ML: at 21:32

## 2023-04-25 RX ADMIN — SODIUM CHLORIDE 1 G: 900 INJECTION INTRAVENOUS at 10:51

## 2023-04-25 RX ADMIN — SODIUM CHLORIDE 100 ML/HR: 9 INJECTION, SOLUTION INTRAVENOUS at 17:38

## 2023-04-25 RX ADMIN — FAMOTIDINE 40 MG: 20 TABLET, FILM COATED ORAL at 08:54

## 2023-04-25 RX ADMIN — ASPIRIN 81 MG: 81 TABLET, COATED ORAL at 08:54

## 2023-04-25 NOTE — PROGRESS NOTES
[facility] Medicine Services  PROGRESS NOTE    Patient Name: Charley Mckee  : 1965  MRN: [id number]    Date of Admission: 2023  Primary Care Physician: Eze Banerjee DO    Subjective   Subjective     CC:  stroke    HPI:  Her headache appears to be better today however she still is endorsing seeing spots on the right side of her vision.  She endorses some dysuria and urinary symptoms.  Denies any new fevers or body aches or other infectious symptoms.  Currently she is getting an echo    ROS:  Gen: no fever or chills  Pulm: no cough  GI: no nausea    Objective   Objective     Vital Signs:   Temp:  [97.2 °F (36.2 °C)-98 °F (36.7 °C)] 97.6 °F (36.4 °C)  Heart Rate:  [68-87] 70  Resp:  [18-20] 20  BP: (110-140)/(52-69) 140/62     Physical Exam:  Constitutional - appears fatigued, in bed  HEENT-NCAT, mucous membranes moist  CV-RRR, holosystolic murmur appreciated  Resp-CTAB  Abd-soft, nontender, nondistended, normoactive bowel sounds  Ext-No lower extremity cyanosis, clubbing or edema bilaterally  Neuro-no apparent new focal neurodeficit.  She does see spots in her right visual field.  Psych-flat affect   Skin- No rash on exposed UE or LE bilaterally      Results Reviewed:  LAB RESULTS:      Lab 23  0656 23  0513 23  0701 23  1857 23  1123   WBC  --  40.84* 27.13* 16.12*  --    HEMOGLOBIN  --  12.6 14.3 15.0  --    HEMATOCRIT  --  37.8 42.5 44.9  --    PLATELETS  --  373 393 417  --    NEUTROS ABS  --  35.24* 25.01* 14.85*  --    IMMATURE GRANS (ABS)  --  0.98* 0.26* 0.15*  --    LYMPHS ABS  --  2.65 1.48 1.02  --    MONOS ABS  --  1.88* 0.34 0.03*  --    EOS ABS  --  0.01 0.00 0.00  --    MCV  --  100.5* 99.5* 99.3*  --    CRP  --   --  0.73*  --   --    PROCALCITONIN 0.03  --  0.02  --   --    LACTATE 1.9  --   --   --   --    PROTIME  --   --   --   --  9.6         Lab 23  0513 23  0701 23  1857 23  1122    SODIUM 145 142 140  --    POTASSIUM 4.0 3.9 4.0  --    CHLORIDE 114* 111* 109*  --    CO2 22.0 19.0* 19.0*  --    ANION GAP 9.0 12.0 12.0  --    BUN 13 7 8  --    CREATININE 0.52* 0.60 0.63  --    EGFR 108.5 104.8 103.6  --    GLUCOSE 92 146* 162*  --    CALCIUM 8.9 8.9 9.5  --    MAGNESIUM  --   --   --  1.7*   HEMOGLOBIN A1C  --  5.20  --   --    TSH  --  0.834  --   --          Lab 04/23/23  1857   TOTAL PROTEIN 7.0   ALBUMIN 4.4   GLOBULIN 2.6   ALT (SGPT) 7   AST (SGOT) 14   BILIRUBIN 0.2   ALK PHOS 129*         Lab 04/18/23  1123   PROTIME 9.6   INR 0.91         Lab 04/25/23  0513   CHOLESTEROL 239*   LDL CHOL 164*   HDL CHOL 36*   TRIGLYCERIDES 208*             Brief Urine Lab Results  (Last result in the past 365 days)      Color   Clarity   Blood   Leuk Est   Nitrite   Protein   CREAT   Urine HCG        04/24/23 1926 Yellow   Clear   Negative   Trace   Negative   Negative                 Microbiology Results Abnormal     None          Adult Transthoracic Echo Complete W/ Cont if Necessary Per Protocol    Result Date: 4/25/2023  •  Left ventricular systolic function is normal. Calculated left ventricular EF = 58.8% •  Left ventricular diastolic function was normal. •  Saline test results are negative. •  Estimated right ventricular systolic pressure from tricuspid regurgitation is normal (<35 mmHg). •  There is a small (<1cm) pericardial effusion.     CT Chest With Contrast Diagnostic    Result Date: 4/24/2023  CT CHEST W CONTRAST DIAGNOSTIC, CT ABDOMEN PELVIS W CONTRAST Date of Exam: 4/23/2023 10:09 PM EDT Indication: eval for malignancy. Comparison: PET/CT from May 23, 2018 Technique: Axial CT images were obtained of the chest abdomen and pelvis after the uneventful intravenous administration of 80 mL Isovue-300.  Reconstructed coronal and sagittal images were also obtained. Automated exposure control and iterative construction methods were used. Findings: Chest: The central tracheobronchial tree is  clear. There is mild centrilobular emphysema. Multiple benign calcified granulomas are noted. No concerning pulmonary nodule is identified. There is no focal consolidation. There is no pleural effusion. The heart size appears normal. The great vessels are normal in caliber. No abnormally enlarged lymph nodes are identified. No aggressive osseous lesions are identified. Abdomen/pelvis: The liver, gallbladder, adrenal glands, kidneys, spleen, and pancreas are unremarkable. The stomach appears normal. The small bowel appears normal in caliber and configuration. The colon appears normal. The appendix appears normal. There is no ascites or loculated collection. No abnormally enlarged lymph nodes are identified. The rectum, uterus and adnexa, and urinary bladder are unremarkable. No aggressive osseous lesions are identified.     Impression: Impression: 1.No evidence of malignancy within chest/abdomen/pelvis. 2.No acute process identified. 3.Mild emphysema. Electronically Signed: Savage Gill  4/24/2023 9:53 AM EDT  Workstation ID: [id number]    CT Abdomen Pelvis With Contrast    Result Date: 4/24/2023  CT CHEST W CONTRAST DIAGNOSTIC, CT ABDOMEN PELVIS W CONTRAST Date of Exam: 4/23/2023 10:09 PM EDT Indication: eval for malignancy. Comparison: PET/CT from May 23, 2018 Technique: Axial CT images were obtained of the chest abdomen and pelvis after the uneventful intravenous administration of 80 mL Isovue-300.  Reconstructed coronal and sagittal images were also obtained. Automated exposure control and iterative construction methods were used. Findings: Chest: The central tracheobronchial tree is clear. There is mild centrilobular emphysema. Multiple benign calcified granulomas are noted. No concerning pulmonary nodule is identified. There is no focal consolidation. There is no pleural effusion. The heart size appears normal. The great vessels are normal in caliber. No abnormally enlarged lymph nodes are identified. No  aggressive osseous lesions are identified. Abdomen/pelvis: The liver, gallbladder, adrenal glands, kidneys, spleen, and pancreas are unremarkable. The stomach appears normal. The small bowel appears normal in caliber and configuration. The colon appears normal. The appendix appears normal. There is no ascites or loculated collection. No abnormally enlarged lymph nodes are identified. The rectum, uterus and adnexa, and urinary bladder are unremarkable. No aggressive osseous lesions are identified.     Impression: Impression: 1.No evidence of malignancy within chest/abdomen/pelvis. 2.No acute process identified. 3.Mild emphysema. Electronically Signed: Savage Gill  4/24/2023 9:53 AM EDT  Workstation ID: [id number]    XR Chest 1 View    Result Date: 4/24/2023  XR CHEST 1 VW Date of Exam: 4/24/2023 8:29 AM EDT Indication: leukocytosis Comparison: CT chest April 23, 2023 FINDINGS: No definite focal or diffuse pulmonary infiltrate is identified. Multiple calcified granulomas are again seen. There are findings of emphysema, as seen on the prior CT. No pneumothorax or significant pleural effusion.  Heart size and mediastinal contour appear within normal limits. Calcification adjacent to the lateral right humeral head may represent calcific tendinopathy of the rotator cuff. There is slight right convex curvature of the thoracic spine.     Impression: 1.Emphysema. 2.No definite radiographic findings of acute cardiopulmonary abnormality. Electronically Signed: Doug Moeller  4/24/2023 12:03 PM EDT  Workstation ID: [id number]    MRI Outside Films    Result Date: 4/23/2023  This procedure was auto-finalized with no dictation required.    CT Outside Films    Result Date: 4/23/2023  This procedure was auto-finalized with no dictation required.    CT Outside Films    Result Date: 4/23/2023  This procedure was auto-finalized with no dictation required.      Results for orders placed during the hospital encounter of 04/23/23    Adult  Transthoracic Echo Complete W/ Cont if Necessary Per Protocol    Interpretation Summary  •  Left ventricular systolic function is normal. Calculated left ventricular EF = 58.8%  •  Left ventricular diastolic function was normal.  •  Saline test results are negative.  •  Estimated right ventricular systolic pressure from tricuspid regurgitation is normal (<35 mmHg).  •  There is a small (<1cm) pericardial effusion.      Current medications:  Scheduled Meds:aspirin, 81 mg, Oral, Daily  atorvastatin, 80 mg, Oral, Nightly  cefTRIAXone, 1 g, Intravenous, Q24H  famotidine, 40 mg, Oral, Daily  levETIRAcetam, 500 mg, Intravenous, Q12H  nicotine, 1 patch, Transdermal, Q24H  sodium chloride, 10 mL, Intravenous, Q12H      Continuous Infusions:sodium chloride, 100 mL/hr, Last Rate: 100 mL/hr (04/25/23 0159)      PRN Meds:.•  acetaminophen **OR** acetaminophen **OR** acetaminophen  •  ALPRAZolam  •  senna-docusate sodium **AND** polyethylene glycol **AND** bisacodyl **AND** bisacodyl  •  ketorolac  •  sodium chloride  •  sodium chloride    Assessment & Plan   Assessment & Plan     Active Hospital Problems    Diagnosis  POA   • **Ataxia [R27.0]  Yes   • Brain mass [G93.89]  Unknown   • Migraines [G43.909]  Unknown   • Tobacco abuse [Z72.0]  Unknown      Resolved Hospital Problems   No resolved problems to display.        Brief Hospital Course to date:  Charley Mckee is a 58 y.o. female with history of tobacco abuse and possible bone lesions presents with headaches, short term memory deficits, vision loss and right sided paresthesias.    Probable subacute stroke  - initial concern for brain mass, however neurosurgery favors subacute stroke (old L sided occipital polar stroke). Recommends evaluation by stroke team and repeat imaging in 3 weeks  - asa, high intensity statin  - discontinued dexamethasone  - check full lipid panel, LDL is above goal, continues statin  - echo with bubble  - PT/OT--> recommends home  - follow  up with Neurosurgery in 3-4 weeks with MRI w/wo  - Awaiting further neurology recommendations  -She was placed on Keppra for seizure prophylaxis given intermittent numbness    Tobacco abuse  -  cessation, nicotine replacement    Leukocytosis  - suspect secondary to steroids, however has increased further today  - no abnormality on CT chest abdomen and pelvis  - afebrile, procalcitonin low  - check UA, shows bacteria, white blood cells.  She also reports urinary symptoms.  Given increasing leukocytosis started on ceftriaxone and ordered blood cultures.  ID consulted for further recs      Expected Discharge Location and Transportation: home  Expected Discharge   Expected Discharge Date: 04/27/23       DVT prophylaxis:  Mechanical DVT prophylaxis orders are present.     AM-PAC 6 Clicks Score (PT): 20 (04/24/23 0909)    CODE STATUS:   Code Status and Medical Interventions:   Ordered at: 04/23/23 1831     Level Of Support Discussed With:    Patient     Code Status (Patient has no pulse and is not breathing):    CPR (Attempt to Resuscitate)     Medical Interventions (Patient has pulse or is breathing):    Full Support       Elisabeth Ramirez MD  04/25/23

## 2023-04-25 NOTE — PLAN OF CARE
Goal Outcome Evaluation:  Plan of Care Reviewed With: patient, spouse        Progress: no change (OT IE)  Outcome Evaluation: OT evaluation completed. Pt presents with decreased I in ADLs, related t/fs, mobility compared to PLOF limited by impaired vision at R side, balance deficits, decreased activity tolerance, muscle weakness at RUE, intermittent sensation deficits at R side, pain and fatigue following more dynamic tasks. Pt presented with unsteadiness upin initial standing and sought furniture support during ADL related mobility to/from bathroom and req'd increased A with ADLs w/ education provided on optimal seq for threading and unthreading givne more R side deficits. Recommend IPOT POC and home with A and HHOT when medically ready and pending progress.

## 2023-04-25 NOTE — TELEPHONE ENCOUNTER
----- Message from Reuben Javier MD sent at 4/25/2023  1:47 PM EDT -----  Regarding: needs fyu  Have this patient follow-up with [facility] in 3 weeks with an MRI of the brain with and without contrast to check for resolution of the stroke if she has worsening symptoms be sure to notify me

## 2023-04-25 NOTE — PROGRESS NOTES
Stroke Progress Note       Chief Complaint: Right visual field loss    Subjective    Subjective     Subjective:  No acute events overnight.  Patient is having her lunch.  Feels weak all over    Review of Systems   Fatigue  Objective      Temp:  [97.6 °F (36.4 °C)-98.5 °F (36.9 °C)] 98.5 °F (36.9 °C)  Heart Rate:  [67-87] 67  Resp:  [18-20] 18  BP: (117-143)/(58-69) 143/69    NEURO( Exam is performed with help of hospital staff at bed side and observed by me via audio-video interface)    MENTAL STATUS: AAOx3, memory intact, fund of knowledge appropriate    LANG/SPEECH: Naming and repetition intact, fluent, follows 3-step commands    CRANIAL NERVES:  Right homonymous hemianopsia.    Pupils equal and reactive, EOMI intact, no gaze deviation, no nystagmus  No facial droop, cough and gag intact, shoulder shrug intact, tongue midline     MOTOR:  Moves all extremities equally    SENSORY: Normal to light touch all throughout     COORDINATION: Normal finger to nose and heel to shin, no tremor, no dysmetria    STATION: Not assessed due to patient condition    GAIT: Not assessed due to patient condition  Results Review:    I reviewed the patient's new clinical results.    Lab Results (last 24 hours)     Procedure Component Value Units Date/Time    Blood Culture - Blood, Hand, Right [[id number]] Collected: 04/25/23 0855    Specimen: Blood from Hand, Right Updated: 04/25/23 1025    Blood Culture - Blood, Arm, Left [[id number]] Collected: 04/25/23 0833    Specimen: Blood from Arm, Left Updated: 04/25/23 1025    Procalcitonin [[id number]]  (Normal) Collected: 04/25/23 0656    Specimen: Blood Updated: 04/25/23 0805     Procalcitonin 0.03 ng/mL     Narrative:      As a Marker for Sepsis (Non-Neonates):    1. <0.5 ng/mL represents a low risk of severe sepsis and/or septic shock.  2. >2 ng/mL represents a high risk of severe sepsis and/or septic shock.    As a Marker for Lower Respiratory Tract Infections that require antibiotic  "therapy:    PCT on Admission    Antibiotic Therapy       6-12 Hrs later    >0.5                Strongly Recommended  >0.25 - <0.5        Recommended   0.1 - 0.25          Discouraged              Remeasure/reassess PCT  <0.1                Strongly Discouraged     Remeasure/reassess PCT    As 28 day mortality risk marker: \"Change in Procalcitonin Result\" (>80% or <=80%) if Day 0 (or Day 1) and Day 4 values are available. Refer to http://[email or web address]-pct-[email or web address]    Change in PCT <=80%  A decrease of PCT levels below or equal to 80% defines a positive change in PCT test result representing a higher risk for 28-day all-cause mortality of patients diagnosed with severe sepsis for septic shock.    Change in PCT >80%  A decrease of PCT levels of more than 80% defines a negative change in PCT result representing a lower risk for 28-day all-cause mortality of patients diagnosed with severe sepsis or septic shock.       Lactic Acid, Plasma [[id number]]  (Normal) Collected: 04/25/23 0656    Specimen: Blood Updated: 04/25/23 0800     Lactate 1.9 mmol/L      Comment: Falsely depressed results may occur on samples drawn from patients receiving N-Acetylcysteine (NAC) or Metamizole.       CBC & Differential [[id number]]  (Abnormal) Collected: 04/25/23 0513    Specimen: Blood Updated: 04/25/23 0740    Narrative:      The following orders were created for panel order CBC & Differential.  Procedure                               Abnormality         Status                     ---------                               -----------         ------                     CBC Auto Differential[[id number]]        Abnormal            Final result               Scan Slide[[id number]]                                       Final result                 Please view results for these tests on the individual orders.    CBC Auto Differential [[id number]]  (Abnormal) Collected: 04/25/23 0513    Specimen: Blood Updated: 04/25/23 0740     WBC 40.84 10*3/mm3  "     RBC 3.76 10*6/mm3      Hemoglobin 12.6 g/dL      Hematocrit 37.8 %      .5 fL      MCH 33.5 pg      MCHC 33.3 g/dL      RDW 14.3 %      RDW-SD 52.6 fl      MPV 11.2 fL      Platelets 373 10*3/mm3      Neutrophil % 86.3 %      Lymphocyte % 6.5 %      Monocyte % 4.6 %      Eosinophil % 0.0 %      Basophil % 0.2 %      Immature Grans % 2.4 %      Neutrophils, Absolute 35.24 10*3/mm3      Lymphocytes, Absolute 2.65 10*3/mm3      Monocytes, Absolute 1.88 10*3/mm3      Eosinophils, Absolute 0.01 10*3/mm3      Basophils, Absolute 0.08 10*3/mm3      Immature Grans, Absolute 0.98 10*3/mm3      nRBC 0.0 /100 WBC     Narrative:      Appended report. These results have been appended to a previously verified report.    Scan Slide [[id number]] Collected: 04/25/23 0513    Specimen: Blood Updated: 04/25/23 0740     Macrocytes Slight/1+     WBC Morphology Normal     Platelet Morphology Normal    Basic Metabolic Panel [[id number]]  (Abnormal) Collected: 04/25/23 0513    Specimen: Blood Updated: 04/25/23 0702     Glucose 92 mg/dL      BUN 13 mg/dL      Creatinine 0.52 mg/dL      Sodium 145 mmol/L      Potassium 4.0 mmol/L      Comment: Slight hemolysis detected by analyzer. Results may be affected.        Chloride 114 mmol/L      CO2 22.0 mmol/L      Calcium 8.9 mg/dL      BUN/Creatinine Ratio 25.0     Anion Gap 9.0 mmol/L      eGFR 108.5 mL/min/1.73     Narrative:      GFR Normal >60  Chronic Kidney Disease <60  Kidney Failure <15      Lipid Panel [[id number]]  (Abnormal) Collected: 04/25/23 0513    Specimen: Blood Updated: 04/25/23 0659     Total Cholesterol 239 mg/dL      Triglycerides 208 mg/dL      HDL Cholesterol 36 mg/dL      LDL Cholesterol  164 mg/dL      VLDL Cholesterol 39 mg/dL      LDL/HDL Ratio 4.48    Narrative:      Cholesterol Reference Ranges  (U.S. Department of Health and Human Services ATP III Classifications)    Desirable          <200 mg/dL  Borderline High    200-239 mg/dL  High Risk          >240  mg/dL      Triglyceride Reference Ranges  (U.S. Department of Health and Human Services ATP III Classifications)    Normal           <150 mg/dL  Borderline High  150-199 mg/dL  High             200-499 mg/dL  Very High        >500 mg/dL    HDL Reference Ranges  (U.S. Department of Health and Human Services ATP III Classifications)    Low     <40 mg/dl (major risk factor for CHD)  High    >60 mg/dl ('negative' risk factor for CHD)        LDL Reference Ranges  (U.S. Department of Health and Human Services ATP III Classifications)    Optimal          <100 mg/dL  Near Optimal     100-129 mg/dL  Borderline High  130-159 mg/dL  High             160-189 mg/dL  Very High        >189 mg/dL    Urinalysis, Microscopic Only - Urine, Clean Catch [[id number]]  (Abnormal) Collected: 04/24/23 1926    Specimen: Urine, Clean Catch Updated: 04/24/23 1931     RBC, UA 3-6 /HPF      WBC, UA 3-5 /HPF      Comment: Urine culture not indicated.        Bacteria, UA 4+ /HPF      Squamous Epithelial Cells, UA 0-2 /HPF      Hyaline Casts, UA None Seen /LPF      Methodology Automated Microscopy    Urinalysis With Culture If Indicated - Urine, Clean Catch [[id number]]  (Abnormal) Collected: 04/24/23 1926    Specimen: Urine, Clean Catch Updated: 04/24/23 1931     Color, UA Yellow     Appearance, UA Clear     pH, UA 5.5     Specific Gravity, UA 1.010     Glucose, UA Negative     Ketones, UA Negative     Bilirubin, UA Negative     Blood, UA Negative     Protein, UA Negative     Leuk Esterase, UA Trace     Nitrite, UA Negative     Urobilinogen, UA 0.2 E.U./dL    Narrative:      In absence of clinical symptoms, the presence of pyuria, bacteria, and/or nitrites on the urinalysis result does not correlate with infection.        CT Chest With Contrast Diagnostic    Result Date: 4/24/2023  Impression: 1.No evidence of malignancy within chest/abdomen/pelvis. 2.No acute process identified. 3.Mild emphysema. Electronically Signed: Savage Gill  4/24/2023  9:53 AM EDT  Workstation ID: [id number]    CT Abdomen Pelvis With Contrast    Result Date: 4/24/2023  Impression: 1.No evidence of malignancy within chest/abdomen/pelvis. 2.No acute process identified. 3.Mild emphysema. Electronically Signed: Savage Gill  4/24/2023 9:53 AM EDT  Workstation ID: [id number]    XR Chest 1 View    Result Date: 4/24/2023  1.Emphysema. 2.No definite radiographic findings of acute cardiopulmonary abnormality. Electronically Signed: Doug Cisnerosalex  4/24/2023 12:03 PM EDT  Workstation ID: [id number]    Results for orders placed during the hospital encounter of 04/23/23    Adult Transthoracic Echo Complete W/ Cont if Necessary Per Protocol    Interpretation Summary  •  Left ventricular systolic function is normal. Calculated left ventricular EF = 58.8%  •  Left ventricular diastolic function was normal.  •  Saline test results are negative.  •  Estimated right ventricular systolic pressure from tricuspid regurgitation is normal (<35 mmHg).  •  There is a small (<1cm) pericardial effusion.            Assessment/Plan       Assessment/Plan:  Patient presented with subacute onset of symptoms with left-sided headache and right-sided visual field loss.  MRI brain revealed left occipital restricted diffusion lesion with pseudonormalization on ADC.  Likely subacute stroke.  Work-up is incomplete.  Differential vasculitis cannot be ruled out, but given the elderly age, and risk factors of tobacco abuse, favor less likely vasculitis. It likely it is due to the stenosis of the left PCA versus cardioembolism.  With a circumscribed lesion, the differential of possible tumor/low grade glioma was entertained, however I am not convinced that this is a tumor.  Okay to repeat an MRI brain with and without contrast in a month to evaluate for any progression.  Followed by stroke clinic.    Plan   -TCD-alert for shunt  -KIRILL to evaluate any cardiac source/aortic arch atheroma/left atrial appendage thrombus.  -Continue  aspirin full dose for now.  -Normal blood pressure goals.  -PT OT speech can continue to work with the patient.  -Likely candidate for acute inpatient rehab.    Neurology will continue to follow the patient.      Sam Landaverde MD  04/25/23  14:15 EDT

## 2023-04-25 NOTE — CONSULTS
[facility]    INFECTIOUS DISEASE CONSULT/INITIAL HOSPITAL VISIT    Charley Mckee  1965  [id number]    Date of consult: 4/25/2023    Admit date: 4/23/2023    Requesting Provider: Laila Newby MD  Evaluating physician: Alverto Ortiz MD  Reason for Consultation: Leukocytosis, left occipital mass stroke versus other  Chief Complaint: Above      Subjective   History of present illness:  Patient is a  58 y.o.  Yr old female with a history of DJD, migraines, tobacco use, who presented to [facility] on Tuesday 4/18 secondary to headache and short-term memory deficit and right upper extremity lower extremity numbness and tingling with balance disturbance and mild right visual field decrease vision.  Patient was treated for migraines and discharged home.  Balance difficulties persisted.  Headache was worsened and was evaluated at [facility] where a CT showed CVA versus less likely mass in the occipital area.  MRI was consistent with left occipital CVA and less likely mass per neurology's.  She was admitted to [facility] on 4/23/2023.  No high fevers or chills.  Patient was placed on high-dose dexamethasone since 4/23 through 4/24.  She developed leukocytosis from 16,000 up to 40,000 on 4/25.  No fevers or chills.  Blood cultures from 4/25 obtained.  Urinalysis was unremarkable from 4/24.  I was consulted on 4/25 for the leukocytosis.  No other localizing signs or symptoms of infection.  Chest x-ray had emphysema without infiltrate.  CT scan of the chest with centrilobular emphysema and multiple benign calcified granulomas.  No consolidation.  CT of the abdomen and pelvis without significant findings.  CTA of the brain without large vessel occlusion.  CT of the head consistent with lacunar infarcts in the left and right basal ganglia chronic.  WBC on 4/18 was 14.9.  QTc interval 440 ms on 4/18.  TTE without  evidence of endocarditis.    Past Medical History:   Diagnosis Date   • Arthritis    • Headache    • Migraines 4/23/2023   • Tobacco abuse 4/23/2023       Past Surgical History:   Procedure Laterality Date   • BREAST EXCISIONAL BIOPSY Right     AGE 36   • NECK SURGERY      ACDF C4-5, C5-6 w/ Dr. Aleman       Pediatric History   Patient Parents   • Shaunna Soni (Mother)     Other Topics Concern   • Not on file   Social History Narrative    .  Lives with . Independent at baseline.  No HH, DMe or oxygen        family history includes Breast cancer in her maternal aunt, maternal aunt, and maternal aunt; Breast cancer (age of onset: 74) in her mother; Cancer in her father, mother, and son; Diabetes in her father and mother; Heart disease in her father; Stroke in her father and mother.    No Known Allergies      There is no immunization history on file for this patient.    Medication:    Current Facility-Administered Medications:   •  acetaminophen (TYLENOL) tablet 650 mg, 650 mg, Oral, Q4H PRN, 650 mg at 04/24/23 1207 **OR** acetaminophen (TYLENOL) 160 MG/5ML solution 650 mg, 650 mg, Oral, Q4H PRN **OR** acetaminophen (TYLENOL) suppository 650 mg, 650 mg, Rectal, Q4H PRN, More Gregg, APRN  •  ALPRAZolam (XANAX) tablet 0.5 mg, 0.5 mg, Oral, TID PRN, More Gregg, APRN, 0.5 mg at 04/25/23 0902  •  aspirin EC tablet 81 mg, 81 mg, Oral, Daily, Radu Us MD, 81 mg at 04/25/23 0854  •  atorvastatin (LIPITOR) tablet 80 mg, 80 mg, Oral, Nightly, Radu Us MD, 80 mg at 04/24/23 2107  •  sennosides-docusate (PERICOLACE) 8.6-50 MG per tablet 2 tablet, 2 tablet, Oral, BID PRN **AND** polyethylene glycol (MIRALAX) packet 17 g, 17 g, Oral, Daily PRN **AND** bisacodyl (DULCOLAX) EC tablet 5 mg, 5 mg, Oral, Daily PRN **AND** bisacodyl (DULCOLAX) suppository 10 mg, 10 mg, Rectal, Daily PRN, More Gergg, APRN  •  cefTRIAXone (ROCEPHIN) 1 g/100 mL 0.9% NS (MBP), 1  g, Intravenous, Q24H, Elisabeth Ramirez MD, 1 g at 04/25/23 1051  •  famotidine (PEPCID) tablet 40 mg, 40 mg, Oral, Daily, More Gregg APRN, 40 mg at 04/25/23 0854  •  ketorolac (TORADOL) injection 15 mg, 15 mg, Intravenous, Q6H PRN, Radu Us MD, 15 mg at 04/25/23 1255  •  levETIRAcetam in NaCl 0.82% (KEPPRA) IVPB 500 mg, 500 mg, Intravenous, Q12H, Jennie Pat APRN, Last Rate: 0 mL/hr at 04/23/23 2122, 500 mg at 04/25/23 0858  •  nicotine (NICODERM CQ) 21 MG/24HR patch 1 patch, 1 patch, Transdermal, Q24H, More Gregg APRN, 1 patch at 04/25/23 0855  •  sodium chloride 0.9 % flush 10 mL, 10 mL, Intravenous, Q12H, More Gregg APRN, 10 mL at 04/25/23 0855  •  sodium chloride 0.9 % flush 10 mL, 10 mL, Intravenous, PRN, More Gregg APRN  •  sodium chloride 0.9 % infusion 40 mL, 40 mL, Intravenous, PRN, More Gregg APRN  •  sodium chloride 0.9 % infusion, 100 mL/hr, Intravenous, Continuous, More Gregg APRN, Last Rate: 100 mL/hr at 04/25/23 0159, 100 mL/hr at 04/25/23 0159    Please refer to the medical record for a full medication list    Review of Systems:    Constitutional-- No Fever, chills or sweats.  Appetite good, and no malaise. No fatigue.  HEENT-- No new vision, hearing or throat complaints.  No epistaxis or oral sores.  Denies odynophagia or dysphagia.  No odynophagia or dysphagia. No headache, photophobia or neck stiffness.  CV-- No chest pain, palpitation or syncope  Resp-- No SOB/cough/Hemoptysis  GI- No nausea, vomiting, or diarrhea.  No hematochezia, melena, or hematemesis. Denies jaundice or chronic liver disease.  -- No dysuria, hematuria, or flank pain.  Denies hesitancy, urgency or flank pain.  Lymph- no swollen lymph nodes in neck/axilla or groin.   Heme- No active bruising or bleeding; no Hx of DVT or PE.  MS-- no swelling or pain in the bones or joints of arms/legs.  No new back pain.  Neuro--  "No acute focal weakness or numbness in the arms or legs.  No seizures.  Skin--No rashes or lesions    Physical Exam:   Vital Signs   Temp:  [97.6 °F (36.4 °C)-98.5 °F (36.9 °C)] 98.5 °F (36.9 °C)  Heart Rate:  [67-87] 67  Resp:  [18-20] 18  BP: (117-143)/(58-69) 143/69    Temp  Min: 97.6 °F (36.4 °C)  Max: 98.5 °F (36.9 °C)  BP  Min: 117/58  Max: 143/69  Pulse  Min: 67  Max: 87  Resp  Min: 18  Max: 20  SpO2  Min: 93 %  Max: 97 %    Blood pressure 143/69, pulse 67, temperature 98.5 °F (36.9 °C), temperature source Oral, resp. rate 18, height 154.9 cm (60.98\"), weight 51.4 kg (113 lb 5.1 oz), SpO2 97 %.  GENERAL: Awake and alert, in no acute distress. Appears older than stated age.  HEENT:  Normocephalic, atraumatic.  Oropharynx without thrush. Dentition in good repair. No cervical adenopathy. No neck masses.  Ears externally normal, Nose externally normal.  EYES: PERRL. No conjunctival injection. No icterus. EOM full.  LYMPHATICS: No lymphadenopathy of the neck or axillary or inguinal regions.   HEART: No murmur, gallop, or pericardial friction rub. Reg rate rhythm, No JVD at 45 degrees.  LUNGS: Clear to auscultation and percussion. No respiratory distress, no use of accessory muscles.  ABDOMEN: Soft, nontender, nondistended. No appreciable HSM.  Bowel sounds normal.  GENITAL: No external lesions, breasts without masses, back straight, no CVAT, rectal external without lesions.   SKIN: Warm and dry without cutaneous eruptions.  No nodules.    PSYCHIATRIC: Mental status lucid. No confusion.  EXT:  No cellulitic change.  NEURO: Oriented to name, CN 2 to 12 intact, DTR 1 + and symmetric, sensory intact to LT upper and lower extremitiy, motor 5/5 upper and lower extremity, cerebellar and gait not tested.    Results Review:   I reviewed the patient's new clinical results.  I reviewed the patient's new imaging results and agree with the interpretation.  I reviewed the patient's other test results and agree with the " interpretation    Results from last 7 days   Lab Units 04/25/23  0513 04/24/23  0701 04/23/23  1857   WBC 10*3/mm3 40.84* 27.13* 16.12*   HEMOGLOBIN g/dL 12.6 14.3 15.0   HEMATOCRIT % 37.8 42.5 44.9   PLATELETS 10*3/mm3 373 393 417     Results from last 7 days   Lab Units 04/25/23  0513   SODIUM mmol/L 145   POTASSIUM mmol/L 4.0   CHLORIDE mmol/L 114*   CO2 mmol/L 22.0   BUN mg/dL 13   CREATININE mg/dL 0.52*   GLUCOSE mg/dL 92   CALCIUM mg/dL 8.9     Results from last 7 days   Lab Units 04/23/23  1857   ALK PHOS U/L 129*   BILIRUBIN mg/dL 0.2   ALT (SGPT) U/L 7   AST (SGOT) U/L 14         Results from last 7 days   Lab Units 04/24/23  0701   CRP mg/dL 0.73*         Results from last 7 days   Lab Units 04/25/23  0656   LACTATE mmol/L 1.9     Estimated Creatinine Clearance: 96.9 mL/min (A) (by C-G formula based on SCr of 0.52 mg/dL (L)).     Procalitonin Results:      Lab 04/25/23  0656 04/24/23  0701   PROCALCITONIN 0.03 0.02      Brief Urine Lab Results  (Last result in the past 365 days)      Color   Clarity   Blood   Leuk Est   Nitrite   Protein   CREAT   Urine HCG        04/24/23 1926 Yellow   Clear   Negative   Trace   Negative   Negative                No results found for: SITE, ALLENTEST, PHART, VEJ4EEF, PO2ART, XUA6IVM, BASEEXCESS, H3ZWCUKS, HGBBG, HCTABG, OXYHEMOGLOBI, METHHGBN, CARBOXYHGB, CO2CT, BAROMETRIC, MODALITY, FIO2     Microbiology:  No results found for: ACANTHNAEG, AFBCX, BPERTUSSISCX, BLOODCX  No results found for: BCIDPCR, CXREFLEX, CSFCX, CULTURETIS  No results found for: CULTURES, HSVCX, URCX  No results found for: EYECULTURE, GCCX, HSVCULTURE, LABHSV  No results found for: LEGIONELLA, MRSACX, MUMPSCX, MYCOPLASCX  No results found for: NOCARDIACX, STOOLCX  No results found for: THROATCX, UNSTIMCULT, URINECX, CULTURE, VZVCULTUR  No results found for: VIRALCULTU, WOUNDCX     No results found for: TISSCXQ, CULTURES, CULTURE, BLOODCX, ANACX, BALCX, ACIDFASTCX, BODYFLDCX, CXREFLEX, FUNGUSCX,  RESPCX, MRSACX, ROUTCX, BRCHWSHCLT, TISSUECX, URINECX, CMVCX, WOUNDCX, BCIDPCR, BFCULTURE, BLOODCULT(      Radiology:  Imaging Results (Last 72 Hours)     Procedure Component Value Units Date/Time    XR Chest 1 View [[id number]] Collected: 04/24/23 1200     Updated: 04/24/23 1206    Narrative:      XR CHEST 1 VW    Date of Exam: 4/24/2023 8:29 AM EDT    Indication: leukocytosis    Comparison: CT chest April 23, 2023    FINDINGS:  No definite focal or diffuse pulmonary infiltrate is identified. Multiple calcified granulomas are again seen. There are findings of emphysema, as seen on the prior CT. No pneumothorax or significant pleural effusion.  Heart size and mediastinal contour   appear within normal limits. Calcification adjacent to the lateral right humeral head may represent calcific tendinopathy of the rotator cuff. There is slight right convex curvature of the thoracic spine.      Impression:      1.Emphysema.  2.No definite radiographic findings of acute cardiopulmonary abnormality.        Electronically Signed: Doug Moeller    4/24/2023 12:03 PM EDT    Workstation ID: [id number]    CT Chest With Contrast Diagnostic [[id number]] Collected: 04/24/23 0946     Updated: 04/24/23 0957    Narrative:      CT CHEST W CONTRAST DIAGNOSTIC, CT ABDOMEN PELVIS W CONTRAST    Date of Exam: 4/23/2023 10:09 PM EDT    Indication: eval for malignancy.    Comparison: PET/CT from May 23, 2018    Technique: Axial CT images were obtained of the chest abdomen and pelvis after the uneventful intravenous administration of 80 mL Isovue-300.  Reconstructed coronal and sagittal images were also obtained. Automated exposure control and iterative   construction methods were used.      Findings:  Chest:    The central tracheobronchial tree is clear. There is mild centrilobular emphysema. Multiple benign calcified granulomas are noted. No concerning pulmonary nodule is identified. There is no focal consolidation. There is no pleural  effusion.    The heart size appears normal. The great vessels are normal in caliber. No abnormally enlarged lymph nodes are identified.    No aggressive osseous lesions are identified.    Abdomen/pelvis:    The liver, gallbladder, adrenal glands, kidneys, spleen, and pancreas are unremarkable.    The stomach appears normal. The small bowel appears normal in caliber and configuration. The colon appears normal. The appendix appears normal. There is no ascites or loculated collection. No abnormally enlarged lymph nodes are identified.    The rectum, uterus and adnexa, and urinary bladder are unremarkable.    No aggressive osseous lesions are identified.      Impression:      Impression:  1.No evidence of malignancy within chest/abdomen/pelvis.  2.No acute process identified.  3.Mild emphysema.      Electronically Signed: Savage Gill    4/24/2023 9:53 AM EDT    Workstation ID: [id number]    CT Abdomen Pelvis With Contrast [[id number]] Collected: 04/24/23 0946     Updated: 04/24/23 0956    Narrative:      CT CHEST W CONTRAST DIAGNOSTIC, CT ABDOMEN PELVIS W CONTRAST    Date of Exam: 4/23/2023 10:09 PM EDT    Indication: eval for malignancy.    Comparison: PET/CT from May 23, 2018    Technique: Axial CT images were obtained of the chest abdomen and pelvis after the uneventful intravenous administration of 80 mL Isovue-300.  Reconstructed coronal and sagittal images were also obtained. Automated exposure control and iterative   construction methods were used.      Findings:  Chest:    The central tracheobronchial tree is clear. There is mild centrilobular emphysema. Multiple benign calcified granulomas are noted. No concerning pulmonary nodule is identified. There is no focal consolidation. There is no pleural effusion.    The heart size appears normal. The great vessels are normal in caliber. No abnormally enlarged lymph nodes are identified.    No aggressive osseous lesions are identified.    Abdomen/pelvis:    The  liver, gallbladder, adrenal glands, kidneys, spleen, and pancreas are unremarkable.    The stomach appears normal. The small bowel appears normal in caliber and configuration. The colon appears normal. The appendix appears normal. There is no ascites or loculated collection. No abnormally enlarged lymph nodes are identified.    The rectum, uterus and adnexa, and urinary bladder are unremarkable.    No aggressive osseous lesions are identified.      Impression:      Impression:  1.No evidence of malignancy within chest/abdomen/pelvis.  2.No acute process identified.  3.Mild emphysema.      Electronically Signed: Savage Gill    4/24/2023 9:53 AM EDT    Workstation ID: [id number]    MRI Outside Films [[id number]] Resulted: 04/23/23 1708     Updated: 04/23/23 1708    Narrative:      This procedure was auto-finalized with no dictation required.    CT Outside Films [[id number]] Resulted: 04/23/23 1702     Updated: 04/23/23 1702    Narrative:      This procedure was auto-finalized with no dictation required.    CT Outside Films [[id number]] Resulted: 04/23/23 1658     Updated: 04/23/23 1658    Narrative:      This procedure was auto-finalized with no dictation required.          IMPRESSION:     1.  Leukocytosis, neutrophilic, related to Decadron use.  Unlikely sepsis or brain abscess.  Urinalysis unremarkable.  Can have a delayed decrease in the leukocytosis after steroids were stopped yesterday.  2.  Likely left occipital infarct, less likely brain abscess or glioma.  Evaluated by both neurosurgery and neurology.  Recommendations included repeat MRI going forward.  Risk factors include ongoing smoking and hypertension.  TTE without valvular heart disease or endocarditis.  3.  Elevated alkaline phosphatase 129 related to medications.  4.  Essential hypertension.  5.  Migraine headaches.    RECOMMENDATIONS:    1.  Diagnostically, continue to follow patient's physical exam, CBC, CMP, CRP, repeat MRI as recommended by  neurosurgery and neurology in the timeframe recommended which likely will be after X number of weeks to months.  Follow cultures which have been obtained.  2.  Therapeutically, discontinue ceftriaxone and follow off antibiotics.  3.  Supportive care.    I discussed the patient's findings and my recommendations with patient and nursing staff, the patient's , and Dr. Radu Oneill.    Thank you for asking me to see Charley Mckee.  Our group would be pleased to follow this patient over the course of their hospitalization and assist with outpatient antimicrobial therapy, as indicated.  Further recommendations depend on the results of the cultures and clinical course.    Alverto Ortiz MD  4/25/2023

## 2023-04-25 NOTE — THERAPY EVALUATION
Patient Name: Charley Mckee  : 1965    MRN: [id number]                              Today's Date: 2023       Admit Date: 2023    Visit Dx: No diagnosis found.  Patient Active Problem List   Diagnosis   • Cervical stenosis of spinal canal   • Bone mass   • Ataxia   • Brain mass   • Migraines   • Tobacco abuse     Past Medical History:   Diagnosis Date   • Arthritis    • Headache    • Migraines 2023   • Tobacco abuse 2023     Past Surgical History:   Procedure Laterality Date   • BREAST EXCISIONAL BIOPSY Right     AGE 36   • NECK SURGERY      ACDF C4-5, C5-6 w/ Dr. Aleman      General Information     Row Name 23 0849          OT Time and Intention    Document Type evaluation  -JY     Mode of Treatment occupational therapy;individual therapy  -JY     Row Name 23 0849          General Information    Patient Profile Reviewed yes  -JY     Prior Level of Function independent:;all household mobility;community mobility;gait;transfer;bed mobility;ADL's;feeding;grooming;dressing;bathing;home management;cooking;cleaning;driving;work  I with all ADLs, IADLs, related t/fs and mobility w/o AD; actively working and driving; denies any falls  -JY     Existing Precautions/Restrictions fall;seizures;other (see comments)  R eye vision deficits; waxing and waning sensation deficits at R side  -JY     Barriers to Rehab visual deficit  -JY     Row Name 23 0849          Occupational Profile    Environmental Supports and Barriers (Occupational Profile) walk in shower w/ threshold, standard toilet with handrails Earl, DME: none used at baseline, has access to some as needed  -JY     Row Name 23 0849          Living Environment    People in Home spouse;other (see comments)  able to assist as needed  -JY     Row Name 23 0849          Home Main Entrance    Number of Stairs, Main Entrance none  -JY     Row Name 23 0849          Stairs Within Home, Primary    Number of  "Stairs, Within Home, Primary none  -LEAFERY     Row Name 04/25/23 0849          Cognition    Orientation Status (Cognition) oriented x 4  -LEAFERY     Row Name 04/25/23 0849          Safety Issues, Functional Mobility    Safety Issues Affecting Function (Mobility) safety precaution awareness;safety precautions follow-through/compliance;insight into deficits/self-awareness  -JY     Impairments Affecting Function (Mobility) balance;strength;pain;endurance/activity tolerance;visual/perceptual  -JY     Comment, Safety Issues/Impairments (Mobility) pt alert and able to follow commands; seeks out furniture supports during mobility; reports persistent vision deficits at R eye, s/s R side weakness with more functional tasks  -JY           User Key  (r) = Recorded By, (t) = Taken By, (c) = Cosigned By    Initials Name Provider Type    Marj Nye, OT Occupational Therapist                 Mobility/ADL's     Row Name 04/25/23 0896          Bed Mobility    Bed Mobility supine-sit;sit-supine;scooting/bridging  -JY     Scooting/Bridging [place] (Bed Mobility) standby assist  -JY     Supine-Sit [place] (Bed Mobility) standby assist  -JY     Sit-Supine [place] (Bed Mobility) standby assist  -JY     Assistive Device (Bed Mobility) head of bed elevated;bed rails  -JY     Comment, (Bed Mobility) no physical A req'd during bed mobility and pt demonstrates good sequencing and hand placement to advance to EOB and back to semi reclined however HOB elevated throughout and bed rails available for use, both of which pt does not have at home; denies dizziness at EOB yet reports \"feeling slightly different after lying in bed for a while\"  -LEAFER     Row Name 04/25/23 0843          Transfers    Transfers sit-stand transfer;stand-sit transfer;toilet transfer  -JY     Comment, (Transfers) pt demonstrated gross good safety awareness and hand placement for controlled ascend, descend; mildly unsteady upon initial standing  -LEAFERY     Row Name " 04/25/23 0855          Sit-Stand Transfer    Sit-Stand [place] (Transfers) contact guard  -JY     Assistive Device (Sit-Stand Transfers) other (see comments)  no AD used  -AMELIA     Row Name 04/25/23 0855          Stand-Sit Transfer    Stand-Sit [place] (Transfers) contact guard  -JY     Assistive Device (Stand-Sit Transfers) other (see comments)  no AD used  -AMELIA     Row Name 04/25/23 0855          Toilet Transfer    Type (Toilet Transfer) sit-stand;stand-sit  -JY     [place] Level (Toilet Transfer) contact guard;verbal cues  -JY     Assistive Device (Toilet Transfer) commode;grab bars/safety frame  -J     Comment, (Toilet Transfer) cues for optimal positioning of self at toilet to allow for access for sitting w/o entanglement within IV lines; pt used grab bar for supplemental support at R side during ascend, descend; pt has B handrails at toilet at home  -[employer]     Row Name 04/25/23 0855          Functional Mobility    Functional Mobility- Ind. Level contact guard assist;minimum assist (75% patient effort);verbal cues required  -     Functional Mobility- Device other (see comments)  no AD used  -     Functional Mobility-Distance (Feet) --  in room ADL related mobility  -     Functional Mobility- Comment refer to PT for specifics however during in room ADL related mobility pt req'd gross CGA, intermittent min A during transition, turning with IV pole mgmt; pt sought out furniture support intermittently throughout mobility to/from bathroom, may need to trial AD for supplemental support given waxing, waning of sensation at R side and mild R side deficits  -[employer]     Row Name 04/25/23 0855          Activities of Daily Living    BADL Assessment/Intervention upper body dressing;lower body dressing;grooming;toileting  -[employer]     Row Name 04/25/23 0855          Upper Body Dressing Assessment/Training    [place] Level (Upper Body Dressing) doff;don;pajama/robe;minimum assist (75% patient effort);verbal cues   -JY     Position (Upper Body Dressing) edge of bed sitting  -JY     Comment, (Upper Body Dressing) situational A for mgmt of gown around IV at RUE, educated pt on optimal seq for threading and unthreading given more deficits at R side  -     Row Name 04/25/23 0855          Lower Body Dressing Assessment/Training    [place] Level (Lower Body Dressing) doff;don;socks;supervision  -J     Position (Lower Body Dressing) edge of bed sitting  -JY     Comment, (Lower Body Dressing) pt able to d/d socks w/o physical A while seated at EOB, able to demo radha figure 4 position to reach LEs more proximally  -J     Row Name 04/25/23 0855          Grooming Assessment/Training    [place] Level (Grooming) wash face, hands;set up  -     Position (Grooming) sitting up in bed  -     Row Name 04/25/23 0892          Toileting Assessment/Training    [place] Level (Toileting) adjust/manage clothing;standby assist;perform perineal hygiene;independent  -     Assistive Devices (Toileting) commode;grab bar/safety frame  -     Position (Toileting) unsupported sitting;unsupported standing  -J           User Key  (r) = Recorded By, (t) = Taken By, (c) = Cosigned By    Initials Name Provider Type    Marj Nye OT Occupational Therapist               Obj/Interventions     Row Name 04/25/23 0940          Sensory Assessment (Somatosensory)    Sensory Assessment (Somatosensory) bilateral UE;sensation intact  -     Bilateral UE Sensory Assessment general sensation;light touch awareness;intact  -     Sensory Assessment denies any current numbness or tingling and recognizes LT stimuli as intact and symmetrical at BUEs, endorses intermittent numbness at R side  -     Row Name 04/25/23 0995          Vision Assessment/Intervention    Visual Impairment/Limitations corrective lenses full-time;peripheral vision impaired right;visual/perceptual impairments present  -     Vision Assessment Comment pt reports persistent  spotted and blurred vision at R eye, appears to have visual field loss between R peripheral and midline; able to track L < > R, up/down and correctly recognize L peripheral input  -J     Row Name 04/25/23 0948          Range of Motion Comprehensive    General Range of Motion bilateral upper extremity ROM WFL  -[facility] Name 04/25/23 0948          Strength Comprehensive (MMT)    General Manual Muscle Testing (MMT) Assessment upper extremity strength deficits identified  -JY     Comment, General Manual Muscle Testing (MMT) Assessment LUE 4+/5, RUE 4/5; mild motor drift, tremors noted at RUE when held against gravity  -J     Row Name 04/25/23 0948          Motor Skills    Motor Skills coordination;functional endurance  -J     Coordination finger to nose;other (see comments);bilateral;upper extremity;WFL  finger thumb opposition  -JY     Functional Endurance mild decrease in activity tolerance toward more dynamic tasks  -[facility] Name 04/25/23 0948          Balance    Balance Assessment sitting static balance;sitting dynamic balance;standing static balance;standing dynamic balance  -JY     Static Sitting Balance supervision  -JY     Dynamic Sitting Balance standby assist  -JY     Position, Sitting Balance unsupported;sitting edge of bed  -JY     Static Standing Balance contact guard;verbal cues  -JY     Dynamic Standing Balance minimal assist;verbal cues  -JY     Position/Device Used, Standing Balance supported;other (see comments)  no AD used  -JY     Balance Interventions sitting;standing;static;dynamic;sit to stand;supported;occupation based/functional task  -JY     Comment, Balance no overt LOB during seated or standing tasks, mild unsteadiness upon standing and sought furniture support intermittently throughout ADL related mobility, may benefit from AD trial  -JY           User Key  (r) = Recorded By, (t) = Taken By, (c) = Cosigned By    Initials Name Provider Type    Marj Nye, OT Occupational  Therapist               Goals/Plan     Row Name 04/25/23 1004          Transfer Goal 1 (OT)    Activity/Assistive Device (Transfer Goal 1, OT) sit-to-stand/stand-to-sit;bed-to-chair/chair-to-bed;toilet;other (see comments)  AD recs per PT; with adaptive/compensatory strategies for vision deficits as needed  -JY     [place] Level/Cues Needed (Transfer Goal 1, OT) standby assist;verbal cues required  -JY     Time Frame (Transfer Goal 1, OT) long term goal (LTG);by discharge  -JY     Progress/Outcome (Transfer Goal 1, OT) new goal  -JY     Row Name 04/25/23 1004          Dressing Goal 1 (OT)    Activity/Device (Dressing Goal 1, OT) upper body dressing;lower body dressing;other (see comments)  with adaptive/compensatory strategies for vision deficits as needed  -JY     [place]/Cues Needed (Dressing Goal 1, OT) supervision required;verbal cues required  -JY     Time Frame (Dressing Goal 1, OT) long term goal (LTG);by discharge  -JY     Progress/Outcome (Dressing Goal 1, OT) new goal  -JY     Row Name 04/25/23 1004          Toileting Goal 1 (OT)    Activity/Device (Toileting Goal 1, OT) adjust/manage clothing;perform perineal hygiene;commode;grab bar/safety frame;raised toilet seat  -JY     [place] Level/Cues Needed (Toileting Goal 1, OT) supervision required;independent  -JY     Time Frame (Toileting Goal 1, OT) long term goal (LTG);by discharge  -JY     Progress/Outcome (Toileting Goal 1, OT) new goal  -JY     Row Name 04/25/23 1004          Strength Goal 1 (OT)    Strength Goal 1 (OT) Pt to complete seated HEP encompassing BUEs targeting strength and endurance w/ progressive sets/reps/resistance in order to improve integration in ADLs, related t/fs, mobility  -JY     Time Frame (Strength Goal 1, OT) long term goal (LTG);by discharge  -JY     Progress/Outcome (Strength Goal 1, OT) new goal  -JY     Row Name 04/25/23 1004          Therapy Assessment/Plan (OT)    Planned Therapy Interventions (OT) activity  tolerance training;BADL retraining;functional balance retraining;occupation/activity based interventions;patient/caregiver education/training;ROM/therapeutic exercise;strengthening exercise;transfer/mobility retraining  -JY           User Key  (r) = Recorded By, (t) = Taken By, (c) = Cosigned By    Initials Name Provider Type    Marj Nye, VIJI Occupational Therapist               Clinical Impression     Row Name 04/25/23 0956          Pain Assessment    Pretreatment Pain Rating 0/10 - no pain  -JY     Posttreatment Pain Rating 5/10  -JY     Pain Location - Side/Orientation Left  -JY     Pain Location generalized  -JY     Pain Location - head  -JY     Pre/Posttreatment Pain Comment initially denied any pain pre OT interventions, post OT interventions largely ADLs and related mobility pt reported progressive headache pain concentrated at L side, RN aware  -JY     Pain Intervention(s) Repositioned;Ambulation/increased activity;Rest  -JY     Row Name 04/25/23 0956          Plan of Care Review    Plan of Care Reviewed With patient;spouse  -JY     Progress no change  OT IE  -JY     Outcome Evaluation OT evaluation completed. Pt presents with decreased I in ADLs, related t/fs, mobility compared to PLOF limited by impaired vision at R side, balance deficits, decreased activity tolerance, muscle weakness at RUE, intermittent sensation deficits at R side, pain and fatigue following more dynamic tasks. Pt presented with unsteadiness upin initial standing and sought furniture support during ADL related mobility to/from bathroom and req'd increased A with ADLs w/ education provided on optimal seq for threading and unthreading givne more R side deficits. Recommend IPOT POC and home with A and HHOT when medically ready and pending progress.  -JMAXIMILIANO     Row Name 04/25/23 0956          Therapy Assessment/Plan (OT)    Patient/Family Therapy Goal Statement (OT) to maximize I in ADLs, related t/fs, mobility, return to PLOF  -JY      Rehab Potential (OT) good, to achieve stated therapy goals  -JY     Criteria for Skilled Therapeutic Interventions Met (OT) yes;skilled treatment is necessary  -JY     Therapy Frequency (OT) daily  -JY     Row Name 04/25/23 0956          Therapy Plan Review/Discharge Plan (OT)    Anticipated Discharge Disposition (OT) home with assist;home with home health  -JY     Row Name 04/25/23 0956          Vital Signs    Pre Systolic BP Rehab 140  supine  -JY     Pre Treatment Diastolic BP 62  -JY     Intra Systolic BP Rehab 151  sitting EOB  -JY     Intra Treatment Diastolic BP 78  -JY     Post Systolic BP Rehab 151  sitting EOB after ADL related mobility  -JY     Post Treatment Diastolic BP 70  -JY     Pretreatment Heart Rate (beats/min) 79  -JY     Posttreatment Heart Rate (beats/min) 75  -JY     Pre SpO2 (%) 97  -JY     O2 Delivery Pre Treatment room air  -JY     O2 Delivery Intra Treatment room air  -JY     Post SpO2 (%) 100  -JY     O2 Delivery Post Treatment room air  -JY     Pre Patient Position Supine  -JY     Intra Patient Position Standing  -JY     Post Patient Position Supine  -JY     Row Name 04/25/23 0956          Positioning and Restraints    Pre-Treatment Position in bed  -JY     Post Treatment Position bed  returned to bed for ECHO per RN  -JY     In Bed notified nsg;fowlers;call light within reach;encouraged to call for assist;exit alarm on;with family/caregiver;RUE elevated;SCD pump applied  RUE elevated on pillow for IV support  -JY           User Key  (r) = Recorded By, (t) = Taken By, (c) = Cosigned By    Initials Name Provider Type    Marj Nye OT Occupational Therapist               Outcome Measures     Row Name 04/25/23 1008          How much help from another is currently needed...    Putting on and taking off regular lower body clothing? 3  -JY     Bathing (including washing, rinsing, and drying) 3  -JY     Toileting (which includes using toilet bed pan or urinal) 3  -JY     Putting on and  taking off regular upper body clothing 3  -JY     Taking care of personal grooming (such as brushing teeth) 4  -JY     Eating meals 4  -JY     AM-PAC 6 Clicks Score (OT) 20  -AMELIA     Row Name 04/25/23 1008          Modified [place] Scale    Pre-Stroke Modified [place]ait Scale 6 - Unable to determine (UTD) from the medical record documentation  -JY     Modified [place] Scale 3 - Moderate disability.  Requiring some help, but able to walk without assistance.  -AMELIA     Row Name 04/25/23 1008          Functional Assessment    Outcome Measure Options AM-PAC 6 Clicks Daily Activity (OT);Modified [place]  -JY           User Key  (r) = Recorded By, (t) = Taken By, (c) = Cosigned By    Initials Name Provider Type    Marj Nye OT Occupational Therapist                Occupational Therapy Education     Title: PT OT SLP Therapies (In Progress)     Topic: Occupational Therapy (In Progress)     Point: ADL training (In Progress)     Description:   Instruct learner(s) on proper safety adaptation and remediation techniques during self care or transfers.   Instruct in proper use of assistive devices.              Learning Progress Summary           Patient Acceptance, E,D, NR by AMELIA at 4/25/2023 0757   Family Acceptance, E,D, NR by AMELIA at 4/25/2023 0757                   Point: Home exercise program (Not Started)     Description:   Instruct learner(s) on appropriate technique for monitoring, assisting and/or progressing therapeutic exercises/activities.              Learner Progress:  Not documented in this visit.          Point: Precautions (In Progress)     Description:   Instruct learner(s) on prescribed precautions during self-care and functional transfers.              Learning Progress Summary           Patient Acceptance, E,D, NR by AMELIA at 4/25/2023 0757   Family Acceptance, E,D, NR by AMELIA at 4/25/2023 0757                   Point: Body mechanics (In Progress)     Description:   Instruct learner(s) on proper positioning and spine  alignment during self-care, functional mobility activities and/or exercises.              Learning Progress Summary           Patient Acceptance, E,D, NR by AMELIA at 4/25/2023 0757   Family Acceptance, E,D, NR by AMELIA at 4/25/2023 0757                               User Key     Initials Effective Dates Name Provider Type Discipline    J 06/16/21 -  Marj Romo OT Occupational Therapist OT              OT Recommendation and Plan  Planned Therapy Interventions (OT): activity tolerance training, BADL retraining, functional balance retraining, occupation/activity based interventions, patient/caregiver education/training, ROM/therapeutic exercise, strengthening exercise, transfer/mobility retraining  Therapy Frequency (OT): daily  Plan of Care Review  Plan of Care Reviewed With: patient, spouse  Progress: no change (OT IE)  Outcome Evaluation: OT evaluation completed. Pt presents with decreased I in ADLs, related t/fs, mobility compared to PLOF limited by impaired vision at R side, balance deficits, decreased activity tolerance, muscle weakness at RUE, intermittent sensation deficits at R side, pain and fatigue following more dynamic tasks. Pt presented with unsteadiness upin initial standing and sought furniture support during ADL related mobility to/from bathroom and req'd increased A with ADLs w/ education provided on optimal seq for threading and unthreading givne more R side deficits. Recommend IPOT POC and home with A and HHOT when medically ready and pending progress.     Time Calculation:    Time Calculation- OT     Row Name 04/25/23 1011             Time Calculation- OT    OT Start Time 0757  -JY      OT Received On 04/25/23  -JY      OT Goal Re-Cert Due Date 05/05/23  -JY         Timed Charges    63677 - OT Therapeutic Activity Minutes 10  -JY      07286 - OT Self Care/Mgmt Minutes 14  -JY         Untimed Charges    OT Eval/Re-eval Minutes 48  -JY         Total Minutes    Timed Charges Total Minutes 24  -JY       Untimed Charges Total Minutes 48  -JY       Total Minutes 72  -JY            User Key  (r) = Recorded By, (t) = Taken By, (c) = Cosigned By    Initials Name Provider Type    Marj Nye OT Occupational Therapist              Therapy Charges for Today     Code Description Service Date Service Provider Modifiers Qty    90109935749  OT THERAPEUTIC ACT EA 15 MIN 4/25/2023 Marj Romo OT GO 1    [id number]  OT SELF CARE/MGMT/TRAIN EA 15 MIN 4/25/2023 Marj Romo OT GO 1    [id number]  OT EVAL LOW COMPLEXITY 4 4/25/2023 Marj Romo OT GO 1               Marj Romo OT  4/25/2023

## 2023-04-26 ENCOUNTER — APPOINTMENT (OUTPATIENT)
Dept: CARDIOLOGY | Facility: HOSPITAL | Age: 58
DRG: 66 | End: 2023-04-26

## 2023-04-26 LAB
ALBUMIN SERPL-MCNC: 3.4 G/DL (ref 3.5–5.2)
ALBUMIN/GLOB SERPL: 1.8 G/DL
ALP SERPL-CCNC: 82 U/L (ref 39–117)
ALT SERPL W P-5'-P-CCNC: 29 U/L (ref 1–33)
ANION GAP SERPL CALCULATED.3IONS-SCNC: 10 MMOL/L (ref 5–15)
AST SERPL-CCNC: 31 U/L (ref 1–32)
BASOPHILS # BLD AUTO: 0.08 10*3/MM3 (ref 0–0.2)
BASOPHILS NFR BLD AUTO: 0.5 % (ref 0–1.5)
BILIRUB SERPL-MCNC: 0.2 MG/DL (ref 0–1.2)
BUN SERPL-MCNC: 9 MG/DL (ref 6–20)
BUN/CREAT SERPL: 17 (ref 7–25)
CALCIUM SPEC-SCNC: 8 MG/DL (ref 8.6–10.5)
CHLORIDE SERPL-SCNC: 115 MMOL/L (ref 98–107)
CO2 SERPL-SCNC: 22 MMOL/L (ref 22–29)
CREAT SERPL-MCNC: 0.53 MG/DL (ref 0.57–1)
D-LACTATE SERPL-SCNC: 1.1 MMOL/L (ref 0.5–2)
DEPRECATED RDW RBC AUTO: 52.6 FL (ref 37–54)
EGFRCR SERPLBLD CKD-EPI 2021: 108 ML/MIN/1.73
EOSINOPHIL # BLD AUTO: 0.18 10*3/MM3 (ref 0–0.4)
EOSINOPHIL NFR BLD AUTO: 1.2 % (ref 0.3–6.2)
ERYTHROCYTE [DISTWIDTH] IN BLOOD BY AUTOMATED COUNT: 14.4 % (ref 12.3–15.4)
GLOBULIN UR ELPH-MCNC: 1.9 GM/DL
GLUCOSE SERPL-MCNC: 80 MG/DL (ref 65–99)
HCT VFR BLD AUTO: 37.5 % (ref 34–46.6)
HGB BLD-MCNC: 12.6 G/DL (ref 12–15.9)
IMM GRANULOCYTES # BLD AUTO: 0.11 10*3/MM3 (ref 0–0.05)
IMM GRANULOCYTES NFR BLD AUTO: 0.7 % (ref 0–0.5)
LV EF 2D ECHO EST: 55 %
LYMPHOCYTES # BLD AUTO: 4.8 10*3/MM3 (ref 0.7–3.1)
LYMPHOCYTES NFR BLD AUTO: 32.7 % (ref 19.6–45.3)
MAXIMAL PREDICTED HEART RATE: 163 BPM
MCH RBC QN AUTO: 33.2 PG (ref 26.6–33)
MCHC RBC AUTO-ENTMCNC: 33.6 G/DL (ref 31.5–35.7)
MCV RBC AUTO: 98.7 FL (ref 79–97)
MONOCYTES # BLD AUTO: 0.99 10*3/MM3 (ref 0.1–0.9)
MONOCYTES NFR BLD AUTO: 6.7 % (ref 5–12)
NEUTROPHILS NFR BLD AUTO: 58.2 % (ref 42.7–76)
NEUTROPHILS NFR BLD AUTO: 8.53 10*3/MM3 (ref 1.7–7)
NRBC BLD AUTO-RTO: 0 /100 WBC (ref 0–0.2)
PLATELET # BLD AUTO: 350 10*3/MM3 (ref 140–450)
PMV BLD AUTO: 10.8 FL (ref 6–12)
POTASSIUM SERPL-SCNC: 3.5 MMOL/L (ref 3.5–5.2)
PROCALCITONIN SERPL-MCNC: 0.03 NG/ML (ref 0–0.25)
PROT SERPL-MCNC: 5.3 G/DL (ref 6–8.5)
RBC # BLD AUTO: 3.8 10*6/MM3 (ref 3.77–5.28)
SODIUM SERPL-SCNC: 147 MMOL/L (ref 136–145)
STRESS TARGET HR: 139 BPM
WBC NRBC COR # BLD: 14.69 10*3/MM3 (ref 3.4–10.8)

## 2023-04-26 PROCEDURE — 84145 PROCALCITONIN (PCT): CPT | Performed by: INTERNAL MEDICINE

## 2023-04-26 PROCEDURE — 97530 THERAPEUTIC ACTIVITIES: CPT

## 2023-04-26 PROCEDURE — 93321 DOPPLER ECHO F-UP/LMTD STD: CPT

## 2023-04-26 PROCEDURE — 93312 ECHO TRANSESOPHAGEAL: CPT

## 2023-04-26 PROCEDURE — 93312 ECHO TRANSESOPHAGEAL: CPT | Performed by: INTERNAL MEDICINE

## 2023-04-26 PROCEDURE — 80053 COMPREHEN METABOLIC PANEL: CPT | Performed by: INTERNAL MEDICINE

## 2023-04-26 PROCEDURE — 93325 DOPPLER ECHO COLOR FLOW MAPG: CPT | Performed by: INTERNAL MEDICINE

## 2023-04-26 PROCEDURE — 83605 ASSAY OF LACTIC ACID: CPT | Performed by: INTERNAL MEDICINE

## 2023-04-26 PROCEDURE — 85025 COMPLETE CBC W/AUTO DIFF WBC: CPT | Performed by: STUDENT IN AN ORGANIZED HEALTH CARE EDUCATION/TRAINING PROGRAM

## 2023-04-26 PROCEDURE — 99232 SBSQ HOSP IP/OBS MODERATE 35: CPT | Performed by: NURSE PRACTITIONER

## 2023-04-26 PROCEDURE — 25010000002 MIDAZOLAM PER 1 MG: Performed by: INTERNAL MEDICINE

## 2023-04-26 PROCEDURE — 93321 DOPPLER ECHO F-UP/LMTD STD: CPT | Performed by: INTERNAL MEDICINE

## 2023-04-26 PROCEDURE — 99232 SBSQ HOSP IP/OBS MODERATE 35: CPT | Performed by: STUDENT IN AN ORGANIZED HEALTH CARE EDUCATION/TRAINING PROGRAM

## 2023-04-26 PROCEDURE — 25010000002 LEVETIRACETAM IN NACL 0.82% 500 MG/100ML SOLUTION

## 2023-04-26 PROCEDURE — 93325 DOPPLER ECHO COLOR FLOW MAPG: CPT

## 2023-04-26 RX ORDER — ASPIRIN 325 MG
325 TABLET ORAL DAILY
Status: DISCONTINUED | OUTPATIENT
Start: 2023-04-27 | End: 2023-04-27 | Stop reason: HOSPADM

## 2023-04-26 RX ORDER — MIDAZOLAM HYDROCHLORIDE 1 MG/ML
INJECTION INTRAMUSCULAR; INTRAVENOUS
Status: COMPLETED | OUTPATIENT
Start: 2023-04-26 | End: 2023-04-26

## 2023-04-26 RX ADMIN — FAMOTIDINE 40 MG: 20 TABLET, FILM COATED ORAL at 08:22

## 2023-04-26 RX ADMIN — ATORVASTATIN CALCIUM 80 MG: 40 TABLET, FILM COATED ORAL at 20:48

## 2023-04-26 RX ADMIN — Medication 10 ML: at 20:49

## 2023-04-26 RX ADMIN — ALPRAZOLAM 0.5 MG: 0.5 TABLET ORAL at 20:50

## 2023-04-26 RX ADMIN — ASPIRIN 81 MG: 81 TABLET, COATED ORAL at 08:22

## 2023-04-26 RX ADMIN — Medication 1 PATCH: at 08:23

## 2023-04-26 RX ADMIN — SODIUM CHLORIDE 100 ML/HR: 9 INJECTION, SOLUTION INTRAVENOUS at 17:20

## 2023-04-26 RX ADMIN — LEVETIRACETAM 500 MG: 5 INJECTION INTRAVASCULAR at 08:22

## 2023-04-26 RX ADMIN — SODIUM CHLORIDE 100 ML/HR: 9 INJECTION, SOLUTION INTRAVENOUS at 05:07

## 2023-04-26 RX ADMIN — LEVETIRACETAM 500 MG: 5 INJECTION INTRAVASCULAR at 20:48

## 2023-04-26 RX ADMIN — ACETAMINOPHEN 325MG 650 MG: 325 TABLET ORAL at 08:03

## 2023-04-26 RX ADMIN — Medication 10 ML: at 08:22

## 2023-04-26 RX ADMIN — MIDAZOLAM HYDROCHLORIDE 2 MG: 1 INJECTION, SOLUTION INTRAMUSCULAR; INTRAVENOUS at 11:46

## 2023-04-26 NOTE — PLAN OF CARE
Goal Outcome Evaluation:  Plan of Care Reviewed With: patient, spouse        Progress: improving  Outcome Evaluation: Pt increased distance ambulated at this date and demonstrated improvements with stability utilizing FWx. Pt demonstrated decreased activity tolerance with dizziness and noted stable BP throughout. Pt would benefit from continued skilled IPPT POC for balance deficits, strength deficits, and decreased functional endurance. d/c rec home with assist and OP PT to improve balance and assist in returning to PLOF.

## 2023-04-26 NOTE — CASE MANAGEMENT/SOCIAL WORK
Continued Stay Note  RUFUS [place]     Patient Name: Charley Mckee  MRN: [id number]  Today's Date: 4/26/2023    Admit Date: 4/23/2023    Plan: Home with family   Discharge Plan     Row Name 04/26/23 1206       Plan    Plan Home with family    Patient/Family in Agreement with Plan yes    Plan Comments CM spoke with patient at bedside regarding DC planning. Patient states she would like to return home at DC with spouse. Therapy is recommending home health. Patient was screened by [facility] due to being a self pay, however, patient is over income for Medicaid. Patient may be able to go to OP PT closer to home. Case management team will continue to follow plan of care and assist with discharge planning as recommendations are available.    Final Discharge Disposition Code 01 - home or self-care               Discharge Codes    No documentation.               Expected Discharge Date and Time     Expected Discharge Date Expected Discharge Time    Apr 27, 2023             Sonia Higgins RN

## 2023-04-26 NOTE — PROGRESS NOTES
Stroke Progress Note       Chief Complaint:  Right visual field loss    Subjective    Subjective     Subjective: Resting comfortably in bed; no adverse events overnight.  Rates Left sided headache 4/10.  Continues to have right-sided paresthesias, and right visual field disturbance.  Awaiting KIRILL    Review of Systems   Constitutional: Negative for fever.   HENT: Negative for trouble swallowing.    Eyes: Positive for visual disturbance.   Respiratory: Negative for cough and shortness of breath.    Cardiovascular: Negative for chest pain and palpitations.   Neurological: Positive for weakness, numbness and headaches. Negative for facial asymmetry and speech difficulty.            Objective    Objective      Temp:  [97.5 °F (36.4 °C)-98.5 °F (36.9 °C)] 97.9 °F (36.6 °C)  Heart Rate:  [58-74] 58  Resp:  [16-18] 16  BP: (143-163)/(69-85) 163/85        Neurological Exam  Mental Status  Awake, alert and oriented to person, place and time.Alert. Oriented to person, place, and time. Speech is normal. Language is fluent with no aphasia. Attention and concentration are normal.    Cranial Nerves  CN II: Right inferior quadrantanopsia.  CN III, IV, VI: Extraocular movements intact bilaterally. Pupils equal round and reactive to light bilaterally.  CN V:  Right: Diminished sensation of the entire right side of the face.  Left: Facial sensation is normal on the left.  CN VII: Full and symmetric facial movement.  CN XI: Shoulder shrug strength is normal.  CN XII: Tongue midline without atrophy or fasciculations.    Motor  Normal muscle bulk throughout. No fasciculations present. Normal muscle tone. No abnormal involuntary movements. Strength is 5/5 in all four extremities except as noted.  RLE 4+/5.    Sensory  Light touch abnormality:   Decreased sensation to light touch involving the RUE and right face..    Coordination  Right: Finger-to-nose normal. Rapid alternating movement normal.Left: Finger-to-nose normal. Rapid alternating  movement normal.    Gait    Not tested.      Physical Exam  Vitals reviewed.   Constitutional:       Appearance: Normal appearance.   HENT:      Head: Normocephalic and atraumatic.   Eyes:      General: Visual field deficit present.      Extraocular Movements: Extraocular movements intact.      Pupils: Pupils are equal, round, and reactive to light.   Cardiovascular:      Rate and Rhythm: Normal rate.   Pulmonary:      Effort: Pulmonary effort is normal. No respiratory distress.   Abdominal:      General: There is no distension.      Palpations: Abdomen is soft.   Musculoskeletal:         General: No swelling. Normal range of motion.      Cervical back: Normal range of motion and neck supple.   Skin:     General: Skin is warm and dry.   Neurological:      Mental Status: She is alert and oriented to person, place, and time.      Cranial Nerves: Cranial nerve deficit present.      Sensory: Sensory deficit present.      Motor: Weakness present.   Psychiatric:         Mood and Affect: Mood normal.         Speech: Speech normal.         Behavior: Behavior normal.         Results Review:    I reviewed the patient's new clinical results.  WBC   Date Value Ref Range Status   04/26/2023 14.69 (H) 3.40 - 10.80 10*3/mm3 Final     RBC   Date Value Ref Range Status   04/26/2023 3.80 3.77 - 5.28 10*6/mm3 Final     Hemoglobin   Date Value Ref Range Status   04/26/2023 12.6 12.0 - 15.9 g/dL Final     Hematocrit   Date Value Ref Range Status   04/26/2023 37.5 34.0 - 46.6 % Final     MCV   Date Value Ref Range Status   04/26/2023 98.7 (H) 79.0 - 97.0 fL Final     MCH   Date Value Ref Range Status   04/26/2023 33.2 (H) 26.6 - 33.0 pg Final     MCHC   Date Value Ref Range Status   04/26/2023 33.6 31.5 - 35.7 g/dL Final     RDW   Date Value Ref Range Status   04/26/2023 14.4 12.3 - 15.4 % Final     RDW-SD   Date Value Ref Range Status   04/26/2023 52.6 37.0 - 54.0 fl Final     MPV   Date Value Ref Range Status   04/26/2023 10.8 6.0 -  12.0 fL Final     Platelets   Date Value Ref Range Status   04/26/2023 350 140 - 450 10*3/mm3 Final     Neutrophil %   Date Value Ref Range Status   04/26/2023 58.2 42.7 - 76.0 % Final     Lymphocyte %   Date Value Ref Range Status   04/26/2023 32.7 19.6 - 45.3 % Final     Monocyte %   Date Value Ref Range Status   04/26/2023 6.7 5.0 - 12.0 % Final     Eosinophil %   Date Value Ref Range Status   04/26/2023 1.2 0.3 - 6.2 % Final     Basophil %   Date Value Ref Range Status   04/26/2023 0.5 0.0 - 1.5 % Final     Immature Grans %   Date Value Ref Range Status   04/26/2023 0.7 (H) 0.0 - 0.5 % Final     Neutrophils, Absolute   Date Value Ref Range Status   04/26/2023 8.53 (H) 1.70 - 7.00 10*3/mm3 Final     Lymphocytes, Absolute   Date Value Ref Range Status   04/26/2023 4.80 (H) 0.70 - 3.10 10*3/mm3 Final     Monocytes, Absolute   Date Value Ref Range Status   04/26/2023 0.99 (H) 0.10 - 0.90 10*3/mm3 Final     Eosinophils, Absolute   Date Value Ref Range Status   04/26/2023 0.18 0.00 - 0.40 10*3/mm3 Final     Basophils, Absolute   Date Value Ref Range Status   04/26/2023 0.08 0.00 - 0.20 10*3/mm3 Final     Immature Grans, Absolute   Date Value Ref Range Status   04/26/2023 0.11 (H) 0.00 - 0.05 10*3/mm3 Final     nRBC   Date Value Ref Range Status   04/26/2023 0.0 0.0 - 0.2 /100 WBC Final     Lab Results   Component Value Date    GLUCOSE 80 04/26/2023    BUN 9 04/26/2023    CREATININE 0.53 (L) 04/26/2023    EGFR 108.0 04/26/2023    BCR 17.0 04/26/2023    K 3.5 04/26/2023    CO2 22.0 04/26/2023    CALCIUM 8.0 (L) 04/26/2023    PROTENTOTREF 6.5 06/04/2018    ALBUMIN 3.4 (L) 04/26/2023    BILITOT 0.2 04/26/2023    AST 31 04/26/2023    ALT 29 04/26/2023     A1c 5.2      Results for orders placed during the hospital encounter of 04/23/23    Adult Transthoracic Echo Complete W/ Cont if Necessary Per Protocol    Interpretation Summary  •  Left ventricular systolic function is normal. Calculated left ventricular EF =  58.8%  •  Left ventricular diastolic function was normal.  •  Saline test results are negative.  •  Estimated right ventricular systolic pressure from tricuspid regurgitation is normal (<35 mmHg).  •  There is a small (<1cm) pericardial effusion.            Assessment/Plan     Assessment/Plan: 58-year-old,  female with known diagnosis of arthritis, s/p ACDF cervical spine, ongoing tobacco use, and recent work-up for suspicious bone lesion (ultimately identified as a hemangioma) who presents as a transfer from outside hospital with a 5-day history of intermittent right-sided numbness, left-sided headache, right-sided vision loss, gait instability, and memory loss.  Patient was previously seen at another facility and treated for complex migraine with Topamax and Fioricet without improvement.      OSH CT head negative.    OSH CTA head and neck shows moderate PCA stenosis, no other flow-limiting stenosis.  MRI here shows restricted diffusion in the left occipital lobe with pseudonormalization on ADC; suspect this is a subacute ischemic stroke though there remains the possibility it could be a tumor/low-grade glioma and will require neurosurgical follow up.    PTA antiplatelet: None  PTA anticoagulant: None        1. Right-sided visual loss  -Restricted diffusion in the left occipital lobe on MRI; suspect this represents a subacute left occipital stroke though there remains a possibility of possible glioma/ADEM/cerebritis  -TTE, EF 58%, normal left atrium, negative bubble study  -TCD showed faint HIT in the right MCA that was not reproducible.  Essentially normal study  -KIRILL planned for today  -Continue full dose aspirin 325 mg daily  -Normal blood pressure parameters  -Continue PT OT  -Stroke neurology will continue to follow  -Follow-up with neurosurgery in 3 to 4 weeks with repeat MRI brain with and without prior to further differentiate left occipital lesion    2.  HLD  -, goal <70  -Continue  atorvastatin 80 mg daily    3.  Leukocytosis  -ID following; suspect secondary to steroid use, less likely sepsis versus brain abscess.    4.  Tobacco abuse  -Smokes 1 PPD  -Nicotine patch  -Smoking cessation      Plan of care was discussed with patient and Dr. Landaverde.  Stroke neurology will continue to follow        ANJALI Ashley  04/26/23  09:38 EDT

## 2023-04-26 NOTE — PROGRESS NOTES
[facility] Medicine Services  PROGRESS NOTE    Patient Name: Charley Mckee  : 1965  MRN: [id number]    Date of Admission: 2023  Primary Care Physician: Eze Banerjee DO    Subjective   Subjective     CC:  stroke    HPI:  Awaiting KIRILL    ROS:  Gen: no fever or chills  Pulm: no cough  GI: no nausea    Objective   Objective     Vital Signs:   Temp:  [97.5 °F (36.4 °C)-98.1 °F (36.7 °C)] 98.1 °F (36.7 °C)  Heart Rate:  [58-93] 69  Resp:  [16-33] 16  BP: (128-204)/() 128/80     Physical Exam:  Constitutional - appears fatigued, in bed  HEENT-NCAT, mucous membranes moist  CV-RRR, holosystolic murmur appreciated  Resp-CTAB  Abd-soft, nontender, nondistended, normoactive bowel sounds  Ext-No lower extremity cyanosis, clubbing or edema bilaterally  Neuro-no apparent new focal neurodeficit.  She does see spots in her right visual field.  Psych-flat affect   Skin- No rash on exposed UE or LE bilaterally      Results Reviewed:  LAB RESULTS:      Lab 23  0513 23  0656 23  0513 23  0701 23  1857   WBC 14.69*  --  40.84* 27.13* 16.12*   HEMOGLOBIN 12.6  --  12.6 14.3 15.0   HEMATOCRIT 37.5  --  37.8 42.5 44.9   PLATELETS 350  --  373 393 417   NEUTROS ABS 8.53*  --  35.24* 25.01* 14.85*   IMMATURE GRANS (ABS) 0.11*  --  0.98* 0.26* 0.15*   LYMPHS ABS 4.80*  --  2.65 1.48 1.02   MONOS ABS 0.99*  --  1.88* 0.34 0.03*   EOS ABS 0.18  --  0.01 0.00 0.00   MCV 98.7*  --  100.5* 99.5* 99.3*   CRP  --   --   --  0.73*  --    PROCALCITONIN 0.03 0.03  --  0.02  --    LACTATE 1.1 1.9  --   --   --          Lab 23  0513 23  0513 23  0701 23  1857   SODIUM 147* 145 142 140   POTASSIUM 3.5 4.0 3.9 4.0   CHLORIDE 115* 114* 111* 109*   CO2 22.0 22.0 19.0* 19.0*   ANION GAP 10.0 9.0 12.0 12.0   BUN 9 13 7 8   CREATININE 0.53* 0.52* 0.60 0.63   EGFR 108.0 108.5 104.8 103.6   GLUCOSE 80 92 146* 162*   CALCIUM 8.0* 8.9 8.9 9.5    HEMOGLOBIN A1C  --   --  5.20  --    TSH  --   --  0.834  --          Lab 04/26/23  0513 04/23/23  1857   TOTAL PROTEIN 5.3* 7.0   ALBUMIN 3.4* 4.4   GLOBULIN 1.9 2.6   ALT (SGPT) 29 7   AST (SGOT) 31 14   BILIRUBIN 0.2 0.2   ALK PHOS 82 129*             Lab 04/25/23  0513   CHOLESTEROL 239*   LDL CHOL 164*   HDL CHOL 36*   TRIGLYCERIDES 208*             Brief Urine Lab Results  (Last result in the past 365 days)      Color   Clarity   Blood   Leuk Est   Nitrite   Protein   CREAT   Urine HCG        04/24/23 1926 Yellow   Clear   Negative   Trace   Negative   Negative                 Microbiology Results Abnormal     Procedure Component Value - Date/Time    Blood Culture - Blood, Arm, Left [[id number]]  (Normal) Collected: 04/25/23 0833    Lab Status: Preliminary result Specimen: Blood from Arm, Left Updated: 04/26/23 1030     Blood Culture No growth at 24 hours    Blood Culture - Blood, Hand, Right [[id number]]  (Normal) Collected: 04/25/23 0855    Lab Status: Preliminary result Specimen: Blood from Hand, Right Updated: 04/26/23 1030     Blood Culture No growth at 24 hours          Adult Transthoracic Echo Complete W/ Cont if Necessary Per Protocol    Result Date: 4/25/2023  •  Left ventricular systolic function is normal. Calculated left ventricular EF = 58.8% •  Left ventricular diastolic function was normal. •  Saline test results are negative. •  Estimated right ventricular systolic pressure from tricuspid regurgitation is normal (<35 mmHg). •  There is a small (<1cm) pericardial effusion.     Doppler Transcranial Microbubble Injection CAR    Result Date: 4/25/2023  Normal mean velocities and waveforms are seen in the right and left MCA and terminal ICA Following injection of agitated saline, both before and after Valsalva, no abnormal signals were seen in the left MCA Following injection of agitated saline, after Valsalva, a solitary and faint HIT was noted in the right MCA but was never reproduced on  subsequent injections Equivocal bubble study                         Results for orders placed during the hospital encounter of 04/23/23    Adult Transthoracic Echo Complete W/ Cont if Necessary Per Protocol    Interpretation Summary  •  Left ventricular systolic function is normal. Calculated left ventricular EF = 58.8%  •  Left ventricular diastolic function was normal.  •  Saline test results are negative.  •  Estimated right ventricular systolic pressure from tricuspid regurgitation is normal (<35 mmHg).  •  There is a small (<1cm) pericardial effusion.      Current medications:  Scheduled Meds:[START ON 4/27/2023] aspirin, 325 mg, Oral, Daily  atorvastatin, 80 mg, Oral, Nightly  famotidine, 40 mg, Oral, Daily  levETIRAcetam, 500 mg, Intravenous, Q12H  nicotine, 1 patch, Transdermal, Q24H  sodium chloride, 10 mL, Intravenous, Q12H      Continuous Infusions:sodium chloride, 100 mL/hr, Last Rate: 100 mL/hr (04/26/23 0507)      PRN Meds:.•  acetaminophen **OR** acetaminophen **OR** acetaminophen  •  ALPRAZolam  •  senna-docusate sodium **AND** polyethylene glycol **AND** bisacodyl **AND** bisacodyl  •  sodium chloride  •  sodium chloride    Assessment & Plan   Assessment & Plan     Active Hospital Problems    Diagnosis  POA   • **Ataxia [R27.0]  Yes   • Brain mass [G93.89]  Unknown   • Migraines [G43.909]  Unknown   • Tobacco abuse [Z72.0]  Unknown      Resolved Hospital Problems   No resolved problems to display.        Brief Hospital Course to date:  Charley Mckee is a 58 y.o. female with history of tobacco abuse and possible bone lesions presents with headaches, short term memory deficits, vision loss and right sided paresthesias.    Probable subacute stroke  - initial concern for brain mass, however neurosurgery favors subacute stroke (old L sided occipital polar stroke). Recommends evaluation by stroke team and repeat imaging in 3 weeks  - asa, high intensity statin  - discontinued dexamethasone  -  check full lipid panel, LDL is above goal, continues statin  - echo with bubble  - PT/OT--> recommends home  - follow up with Neurosurgery in 3-4 weeks with MRI w/wo  - Awaiting KIRILL to evaluate for any cardiac source of embolus  -She was placed on Keppra for seizure prophylaxis given intermittent numbness    Tobacco abuse  -  cessation, nicotine replacement    Leukocytosis--improving after steroids stopped  - suspect secondary to steroids  - no abnormality on CT chest abdomen and pelvis  - afebrile, procalcitonin low  - ID was consulted- this felt to be secondary to steroids. leukcoytosis already resolving. No need for further abx      Expected Discharge Location and Transportation: home  Expected Discharge   Expected Discharge Date: 04/27/23       DVT prophylaxis:  Mechanical DVT prophylaxis orders are present.     AM-PAC 6 Clicks Score (PT): 22 (04/26/23 1411)    CODE STATUS:   Code Status and Medical Interventions:   Ordered at: 04/23/23 1831     Level Of Support Discussed With:    Patient     Code Status (Patient has no pulse and is not breathing):    CPR (Attempt to Resuscitate)     Medical Interventions (Patient has pulse or is breathing):    Full Support       Elisabeth Ramirez MD  04/26/23

## 2023-04-26 NOTE — THERAPY TREATMENT NOTE
Patient Name: Charley Mckee  : 1965    MRN: [id number]                              Today's Date: 2023       Admit Date: 2023    Visit Dx: No diagnosis found.  Patient Active Problem List   Diagnosis   • Cervical stenosis of spinal canal   • Bone mass   • Ataxia   • Brain mass   • Migraines   • Tobacco abuse     Past Medical History:   Diagnosis Date   • Arthritis    • Headache    • Migraines 2023   • Tobacco abuse 2023     Past Surgical History:   Procedure Laterality Date   • BREAST EXCISIONAL BIOPSY Right     AGE 36   • NECK SURGERY      ACDF C4-5, C5-6 w/ Dr. Aleman      General Information     Row Name 23 1401          Physical Therapy Time and Intention    Document Type therapy note (daily note)  -     Mode of Treatment physical therapy  -     Row Name 23 140          General Information    Patient Profile Reviewed yes  -     Existing Precautions/Restrictions fall;seizures;other (see comments)  R eye vision deficits  -     Barriers to Rehab visual deficit  -     Row Name 23 140          Cognition    Orientation Status (Cognition) oriented x 4  -     Row Name 23 140          Safety Issues, Functional Mobility    Safety Issues Affecting Function (Mobility) insight into deficits/self-awareness;safety precaution awareness;safety precautions follow-through/compliance  -     Impairments Affecting Function (Mobility) balance;strength;pain;endurance/activity tolerance;visual/perceptual  -           User Key  (r) = Recorded By, (t) = Taken By, (c) = Cosigned By    Initials Name Provider Type     Mallory Yan PT Physical Therapist               Mobility     Row Name 23 1401          Bed Mobility    Bed Mobility supine-sit;sit-supine;scooting/bridging  -     Scooting/Bridging [place] (Bed Mobility) standby assist  -     Supine-Sit [place] (Bed Mobility) standby assist  -     Sit-Supine [place] (Bed Mobility)  standby assist  -     Assistive Device (Bed Mobility) head of bed elevated;bed rails  -     Comment, (Bed Mobility) dizziness upon sitting with BP stable  -     Row Name 04/26/23 1401          Sit-Stand Transfer    Sit-Stand [place] (Transfers) supervision  -     Assistive Device (Sit-Stand Transfers) walker, front-wheeled  -HM     Comment, (Sit-Stand Transfer) educated on sequencing with FWx  -     Row Name 04/26/23 1401          Gait/Stairs (Locomotion)    [place] Level (Gait) contact guard  -     Assistive Device (Gait) walker, front-wheeled  -HM     Distance in Feet (Gait) 400+10  -HM     Deviations/Abnormal Patterns (Gait) bilateral deviations;stride length decreased;joe decreased;gait speed decreased  -     Comment, (Gait/Stairs) Pt demonstrated increased stability with FWx at this date and pt educated throughout on sequencing with walker. Pt with no LOB with dizziness throughout. Pt educated on use of FWx for improved balance upon d/c as needed.  -           User Key  (r) = Recorded By, (t) = Taken By, (c) = Cosigned By    Initials Name Provider Type     Mallory Yan, PT Physical Therapist               Obj/Interventions     Row Name 04/26/23 1407          Motor Skills    Therapeutic Exercise other (see comments)  BLE seated marches and LAQ x 10 reps with emphasis on slow controlled movement  -     Row Name 04/26/23 1401          Balance    Balance Assessment sitting static balance;sitting dynamic balance;sit to stand dynamic balance;standing static balance;standing dynamic balance  -     Static Sitting Balance supervision  -     Dynamic Sitting Balance standby assist  -     Position, Sitting Balance unsupported;sitting edge of bed  -     Sit to Stand Dynamic Balance contact guard  -     Static Standing Balance contact guard  -     Dynamic Standing Balance contact guard  -     Position/Device Used, Standing Balance supported;walker, front-wheeled  -HM      Balance Interventions standing;dynamic;occupation based/functional task  -     Comment, Balance no LOB  -           User Key  (r) = Recorded By, (t) = Taken By, (c) = Cosigned By    Initials Name Provider Type     Mallory Yan, PT Physical Therapist               Goals/Plan    No documentation.                Clinical Impression     Row Name 04/26/23 1408          Pain Scale: FACES Pre/Post-Treatment    Pain: FACES Scale, Pretreatment 2-->hurts little bit  -HM     Posttreatment Pain Rating 2-->hurts little bit  -HM     Pain Location generalized  -     Pain Location - head  -     Row Name 04/26/23 1408          Plan of Care Review    Plan of Care Reviewed With patient;spouse  -     Progress improving  -     Outcome Evaluation Pt increased distance ambulated at this date and demonstrated improvements with stability utilizing FWx. Pt demonstrated decreased activity tolerance with dizziness and noted stable BP throughout. Pt would benefit from continued skilled IPPT POC for balance deficits, strength deficits, and decreased functional endurance. d/c rec home with assist and OP PT to improve balance and assist in returning to PLOF.  -     Row Name 04/26/23 1408          Therapy Assessment/Plan (PT)    Rehab Potential (PT) good, to achieve stated therapy goals  -     Criteria for Skilled Interventions Met (PT) yes;meets criteria;skilled treatment is necessary  -     Therapy Frequency (PT) daily  -     Row Name 04/26/23 1408          Vital Signs    Pre Systolic BP Rehab 138  sitting EOB  -HM     Pre Treatment Diastolic BP 69  -HM     Post Systolic BP Rehab 128  -HM     Post Treatment Diastolic BP 80  sitting EOB  -HM     O2 Delivery Pre Treatment room air  -HM     O2 Delivery Intra Treatment room air  -HM     O2 Delivery Post Treatment room air  -HM     Pre Patient Position Supine  -HM     Intra Patient Position Standing  -HM     Post Patient Position Supine  -     Row Name 04/26/23 1400           Positioning and Restraints    Pre-Treatment Position in bed  -HM     Post Treatment Position bed  -HM     In Bed notified nsg;encouraged to call for assist;call light within reach;fowlers;with family/caregiver;side rails up x3  exit alarm off upon arrival  -           User Key  (r) = Recorded By, (t) = Taken By, (c) = Cosigned By    Initials Name Provider Type     Mallory aYn, COLBY Physical Therapist               Outcome Measures     Row Name 04/26/23 1411 04/26/23 0800       How much help from another person do you currently need...    Turning from your back to your side while in flat bed without using bedrails? 4  - 4  -EO    Moving from lying on back to sitting on the side of a flat bed without bedrails? 4  - 4  -EO    Moving to and from a bed to a chair (including a wheelchair)? 4  - 3  -EO    Standing up from a chair using your arms (e.g., wheelchair, bedside chair)? 4  - 3  -EO    Climbing 3-5 steps with a railing? 3  - 3  -EO    To walk in hospital room? 3  - 3  -EO    AM-PAC 6 Clicks Score (PT) 22  - 20  -EO    Highest level of mobility 7 --> Walked 25 feet or more  - 6 --> Walked 10 steps or more  -EO          User Key  (r) = Recorded By, (t) = Taken By, (c) = Cosigned By    Initials Name Provider Type     Eli Kinney RN Registered Nurse     Mallory Yan, COLBY Physical Therapist                             Physical Therapy Education     Title: PT OT SLP Therapies (In Progress)     Topic: Physical Therapy (Done)     Point: Mobility training (Done)     Learning Progress Summary           Patient Acceptance, E,TB, VU,NR by  at 4/26/2023 1414    Acceptance, E,TB, VU,NR by  at 4/24/2023 0958                   Point: Home exercise program (Done)     Learning Progress Summary           Patient Acceptance, E,TB, VU,NR by  at 4/26/2023 1414                   Point: Body mechanics (Done)     Learning Progress Summary           Patient Acceptance, E,TB, VU,NR by  at  4/26/2023 1414    Acceptance, E,TB, VU,NR by  at 4/24/2023 0958                   Point: Precautions (Done)     Learning Progress Summary           Patient Acceptance, E,TB, VU,NR by  at 4/26/2023 1414    Acceptance, E,TB, VU,NR by  at 4/24/2023 0958                               User Key     Initials Effective Dates Name Provider Type [facility] 09/22/22 -  Mallory Yan PT Physical Therapist PT              PT Recommendation and Plan  Planned Therapy Interventions (PT): balance training, bed mobility training, gait training, home exercise program, neuromuscular re-education, manual therapy techniques, patient/family education, postural re-education, ROM (range of motion), strengthening, transfer training  Plan of Care Reviewed With: patient, spouse  Progress: improving  Outcome Evaluation: Pt increased distance ambulated at this date and demonstrated improvements with stability utilizing FWx. Pt demonstrated decreased activity tolerance with dizziness and noted stable BP throughout. Pt would benefit from continued skilled IPPT POC for balance deficits, strength deficits, and decreased functional endurance. d/c rec home with assist and OP PT to improve balance and assist in returning to PLOF.     Time Calculation:    PT Charges     Row Name 04/26/23 1414             Time Calculation    Start Time 1330  -HM      PT Received On 04/26/23  -         Timed Charges    90062 - PT Therapeutic Exercise Minutes 3  -HM      56215 - PT Therapeutic Activity Minutes 23  -HM         Total Minutes    Timed Charges Total Minutes 26  -HM       Total Minutes 26  -HM            User Key  (r) = Recorded By, (t) = Taken By, (c) = Cosigned By    Initials Name Provider Type     Mallory Yan PT Physical Therapist              Therapy Charges for Today     Code Description Service Date Service Provider Modifiers Qty    09763752832  PT THERAPEUTIC ACT EA 15 MIN 4/26/2023 Mallory Yan, PT GP 2          PT  G-Codes  Outcome Measure Options: AM-PAC 6 Clicks Daily Activity (OT), Modified [place]  AM-PAC 6 Clicks Score (PT): 22  AM-PAC 6 Clicks Score (OT): 20  Modified [place] Scale: 3 - Moderate disability.  Requiring some help, but able to walk without assistance.  PT Discharge Summary  Anticipated Discharge Disposition (PT): home with assist, home with outpatient therapy services    Mallory Yan, PT  4/26/2023

## 2023-04-27 VITALS
BODY MASS INDEX: 22.19 KG/M2 | SYSTOLIC BLOOD PRESSURE: 140 MMHG | WEIGHT: 113 LBS | DIASTOLIC BLOOD PRESSURE: 68 MMHG | HEIGHT: 60 IN | HEART RATE: 66 BPM | TEMPERATURE: 97.6 F | OXYGEN SATURATION: 99 % | RESPIRATION RATE: 18 BRPM

## 2023-04-27 LAB
BASOPHILS # BLD AUTO: 0.11 10*3/MM3 (ref 0–0.2)
BASOPHILS NFR BLD AUTO: 0.8 % (ref 0–1.5)
DEPRECATED RDW RBC AUTO: 50.7 FL (ref 37–54)
EOSINOPHIL # BLD AUTO: 0.46 10*3/MM3 (ref 0–0.4)
EOSINOPHIL NFR BLD AUTO: 3.5 % (ref 0.3–6.2)
ERYTHROCYTE [DISTWIDTH] IN BLOOD BY AUTOMATED COUNT: 14 % (ref 12.3–15.4)
HCT VFR BLD AUTO: 40.3 % (ref 34–46.6)
HGB BLD-MCNC: 14 G/DL (ref 12–15.9)
IMM GRANULOCYTES # BLD AUTO: 0.07 10*3/MM3 (ref 0–0.05)
IMM GRANULOCYTES NFR BLD AUTO: 0.5 % (ref 0–0.5)
LYMPHOCYTES # BLD AUTO: 3.79 10*3/MM3 (ref 0.7–3.1)
LYMPHOCYTES NFR BLD AUTO: 28.5 % (ref 19.6–45.3)
MCH RBC QN AUTO: 34.1 PG (ref 26.6–33)
MCHC RBC AUTO-ENTMCNC: 34.7 G/DL (ref 31.5–35.7)
MCV RBC AUTO: 98.1 FL (ref 79–97)
MONOCYTES # BLD AUTO: 1.01 10*3/MM3 (ref 0.1–0.9)
MONOCYTES NFR BLD AUTO: 7.6 % (ref 5–12)
NEUTROPHILS NFR BLD AUTO: 59.1 % (ref 42.7–76)
NEUTROPHILS NFR BLD AUTO: 7.85 10*3/MM3 (ref 1.7–7)
NRBC BLD AUTO-RTO: 0 /100 WBC (ref 0–0.2)
PLATELET # BLD AUTO: 350 10*3/MM3 (ref 140–450)
PMV BLD AUTO: 11.3 FL (ref 6–12)
RBC # BLD AUTO: 4.11 10*6/MM3 (ref 3.77–5.28)
WBC NRBC COR # BLD: 13.29 10*3/MM3 (ref 3.4–10.8)

## 2023-04-27 PROCEDURE — 25010000002 LEVETIRACETAM IN NACL 0.82% 500 MG/100ML SOLUTION

## 2023-04-27 PROCEDURE — 85025 COMPLETE CBC W/AUTO DIFF WBC: CPT | Performed by: STUDENT IN AN ORGANIZED HEALTH CARE EDUCATION/TRAINING PROGRAM

## 2023-04-27 PROCEDURE — 99239 HOSP IP/OBS DSCHRG MGMT >30: CPT | Performed by: NURSE PRACTITIONER

## 2023-04-27 PROCEDURE — 99232 SBSQ HOSP IP/OBS MODERATE 35: CPT

## 2023-04-27 RX ORDER — ACETAMINOPHEN 325 MG/1
650 TABLET ORAL EVERY 4 HOURS PRN
Start: 2023-04-27

## 2023-04-27 RX ORDER — ASPIRIN 325 MG
325 TABLET ORAL DAILY
Qty: 90 TABLET | Refills: 0 | Status: SHIPPED | OUTPATIENT
Start: 2023-04-28

## 2023-04-27 RX ORDER — NICOTINE 21 MG/24HR
1 PATCH, TRANSDERMAL 24 HOURS TRANSDERMAL
Qty: 30 EACH | Refills: 0 | Status: SHIPPED | OUTPATIENT
Start: 2023-04-27

## 2023-04-27 RX ORDER — TRAMADOL HYDROCHLORIDE 50 MG/1
50 TABLET ORAL EVERY 6 HOURS PRN
Qty: 12 TABLET | Refills: 0 | Status: SHIPPED | OUTPATIENT
Start: 2023-04-27

## 2023-04-27 RX ORDER — ATORVASTATIN CALCIUM 80 MG/1
80 TABLET, FILM COATED ORAL NIGHTLY
Qty: 90 TABLET | Refills: 0 | Status: SHIPPED | OUTPATIENT
Start: 2023-04-27

## 2023-04-27 RX ORDER — LEVETIRACETAM 500 MG/1
500 TABLET ORAL 2 TIMES DAILY
Qty: 60 TABLET | Refills: 0 | Status: SHIPPED | OUTPATIENT
Start: 2023-04-27

## 2023-04-27 RX ADMIN — ACETAMINOPHEN 325MG 650 MG: 325 TABLET ORAL at 00:12

## 2023-04-27 RX ADMIN — FAMOTIDINE 40 MG: 20 TABLET, FILM COATED ORAL at 08:53

## 2023-04-27 RX ADMIN — LEVETIRACETAM 500 MG: 5 INJECTION INTRAVASCULAR at 08:53

## 2023-04-27 RX ADMIN — SODIUM CHLORIDE 100 ML/HR: 9 INJECTION, SOLUTION INTRAVENOUS at 08:53

## 2023-04-27 RX ADMIN — Medication 10 ML: at 08:53

## 2023-04-27 RX ADMIN — ASPIRIN 325 MG: 325 TABLET ORAL at 08:53

## 2023-04-27 RX ADMIN — Medication 1 PATCH: at 10:02

## 2023-04-27 NOTE — PROGRESS NOTES
"Enter Query Response Below      Query Response: clinically insignificant cerebral edema.             If applicable, please update the problem list.     Patient: Charley Mckee \"Fifi\"        : 1965  Account: [id number]           Admit Date:         How to Respond to this query:       a. Click New Note     b. Answer query within the yellow box.                c. Update the Problem List, if applicable.      If you have any questions about this query contact me at: [email or web address]    Ms. Fabian:    58 year old female admitted with chief complaint:  Headache, brain mass, ataxia.  Clinical indicators: 23 patient was treated in [place] for migraine and discharged home.   Stroke APRN note: MRI of the brain with and without contrast was also obtained and was concerning for brain mass +/- acute ischemic stroke.  Per radiology report the patient was noted to have hyperdensities within the left occipital lobe (mass of 2.6cm with mild edema), left thalamus, and left basal ganglia. She was transferred for higher level of care and further evaluation by stroke neurology and /or neurosurgery.  Discharge summary:  Probable subacute stroke- - initial concern for brain mass, however neurosurgery favors subacute stroke (old L sided occipital polar stroke). Recommends follow up with stroke team and repeat imaging in 3 weeks.  Treatments:  2 day course of iv decadron, then discontinued.    After study, please further specify:    Clinically insignificant cerebral edema  Clinically significant cerebral edema  Other________________  Unable to determine      By submitting this query, we are merely seeking further clarification of documentation to accurately reflect all conditions that you are monitoring, evaluating, treating or that extend the hospitalization or utilize additional resources of care. Please utilize your independent clinical judgment when addressing the question(s) above.     This query " and your response, once completed, will be entered into the legal medical record.    Sincerely,  Joana Mckinney RN  Clinical Documentation Integrity Program

## 2023-04-27 NOTE — PROGRESS NOTES
Stroke Progress Note       Chief Complaint:  Right visual field loss    Subjective    Subjective     Subjective:   No acute events overnight.  The patient continues to have vision loss in her right lower quadrant in her right eye only.  She does continue to complain of a mild left occipital headache, mild right-sided weakness, intermittent right-sided numbness, and balance instability when ambulating.  She tells me that she is ready to go home.  She has been eating and drinking well and denies any new strokelike symptoms.    Review of Systems   Constitutional: Positive for activity change and fatigue. Negative for chills and fever.   HENT: Negative for trouble swallowing.    Eyes: Positive for visual disturbance.   Respiratory: Negative.    Cardiovascular: Negative.    Gastrointestinal: Negative.  Negative for nausea and vomiting.   Genitourinary: Negative.    Musculoskeletal: Positive for gait problem.   Skin: Negative.    Neurological: Positive for weakness and headaches. Negative for dizziness, speech difficulty and numbness.   Hematological: Negative.    Psychiatric/Behavioral: Negative.                 Objective    Objective      Temp:  [97.5 °F (36.4 °C)-98.5 °F (36.9 °C)] 97.9 °F (36.6 °C)  Heart Rate:  [58-74] 58  Resp:  [16-18] 16  BP: (143-163)/(69-85) 163/85        Neurological Exam  Mental Status  Alert and drowsy. Level of consciousness: Arouses easily to verbal stimuli. Oriented to person, place, time and situation. Oriented to person, place, and time. Speech is normal. Language is fluent with no aphasia. Attention and concentration are normal. Fund of knowledge is appropriate for level of education.    Cranial Nerves  CN II: Right superior quadrantanopsia. Left normal visual field.  CN III, IV, VI: Extraocular movements intact bilaterally. Pupils equal round and reactive to light bilaterally.  CN V: Facial sensation is normal.  CN VII: Full and symmetric facial movement.  CN VIII: Hearing intact  bilaterally.  CN IX, X: Palate elevates symmetrically  CN XII: Tongue midline without atrophy or fasciculations.    Motor  Normal muscle bulk throughout. No fasciculations present. Normal muscle tone.  Bilateral upper and lower extremities with no drift, 5/5 strength  .    Sensory  Sensation is intact to light touch, pinprick, vibration and proprioception in all four extremities.    Coordination    No obvious dysmetria.    Gait   Abnormal gait.Casual gait: Very slight instability.      Physical Exam  Vitals and nursing note reviewed.   Constitutional:       General: She is not in acute distress.     Appearance: Normal appearance. She is not ill-appearing.   HENT:      Head: Normocephalic and atraumatic.      Mouth/Throat:      Mouth: Mucous membranes are moist.   Eyes:      Extraocular Movements: Extraocular movements intact.      Pupils: Pupils are equal, round, and reactive to light.   Cardiovascular:      Rate and Rhythm: Normal rate and regular rhythm.   Pulmonary:      Effort: Pulmonary effort is normal. No respiratory distress.      Comments: On room air  Skin:     General: Skin is warm and dry.   Neurological:      Mental Status: She is alert and oriented to person, place, and time.      Cranial Nerves: No cranial nerve deficit.      Sensory: No sensory deficit.      Motor: No weakness.      Coordination: Coordination normal.      Gait: Gait abnormal.   Psychiatric:         Mood and Affect: Mood normal.         Speech: Speech normal.         Behavior: Behavior normal.         Results Review:    I reviewed the patient's new clinical results.    MRI brain without contrast (OSH) reveals area within the left occipital lobe concerning for late acute/subacute stroke verses low grade glioma.  No evidence of hemorrhage.    TCD with faint HIT in R MCA territory; equivocal.    CT chest/abdomen/pelvis negative for malignancy.    WBC 13.29, improving  H/H 14.0/40.3  Platelets 350  Creatinine 0.53, BUN 9  AST 31  ALT  29  Urinalysis with mild leuk esterase, negative nitrite, and +4 bacteria    A1c 5.2      Results for orders placed during the hospital encounter of 04/23/23    Adult Transthoracic Echo Complete W/ Cont if Necessary Per Protocol    Interpretation Summary  •  Left ventricular systolic function is normal. Calculated left ventricular EF = 58.8%  •  Left ventricular diastolic function was normal.  •  Saline test results are negative.  •  Estimated right ventricular systolic pressure from tricuspid regurgitation is normal (<35 mmHg).  •  There is a small (<1cm) pericardial effusion.  MIGUELANGEL lobular in appearance, no evidence of thrombus or PFO.  LA cavity normal size          Assessment/Plan     Assessment/Plan:     This is a 58-year-old,  female with known diagnosis of arthritis, s/p ACDF cervical spine, ongoing tobacco use, and remote work-up for suspicious bone lesion (ultimately identified as a hemangioma) who presented as a transfer from outside hospital with a 5-day history of intermittent right-sided numbness, left-sided headache, right-sided vision loss, gait instability, and memory loss.  Patient was previously seen at another facility and treated for complex migraine with Topamax and Fioricet without improvement.      OSH CT head negative.      OSH CTA head and neck shows moderate PCA stenosis, no other flow-limiting stenosis.    MRI OSH shows restricted diffusion in the left occipital lobe with pseudonormalization of ADC; Dr. Landaverde and Dr. Javier have reviewed scan and suspect this is a subacute ischemic stroke although there remains the possibility it could be a tumor/low-grade glioma and will require neurosurgical follow up.    PTA antiplatelet: None  PTA anticoagulant: None        1. Right-sided visual loss  -Restricted diffusion in the left occipital lobe on MRI; suspect this represents a subacute left occipital stroke though there remains a possibility of possible  glioma/ADEM/cerebritis  -KIRILL negative for cardioembolic source  -TCD showed faint HIT in the right MCA that was not reproducible.  Essentially normal study; no PFO noted on KIRILL  -Continue full dose aspirin 325 mg daily  -Continue Keppra 500mg BID for seizure prophylaxis  -Normal blood pressure parameters, goal <130/80  -Continue PT/OT; they recommend IPR verses home health at [place]  -CM following for discharge placement needs  -Follow-up with neurosurgery in 3 to 4 weeks with repeat MRI brain with and without prior to further differentiate left occipital lesion  -Follow-up in stroke clinic in 8 weeks    2.  Hyperlipidemia  -, goal <70  -Continue atorvastatin 80 mg daily    3.  Leukocytosis  -ID following; suspect secondary to steroid use, less likely sepsis versus brain abscess.  -Management per hospitalist    4.  Tobacco abuse  -Smokes 1 PPD  -Nicotine patch in place  -Smoking cessation provided to patient; patient tells me that she plans to stop however also tells me that her spouse was stopped as well.  Plans are to send her home with a prescription for nicotine replacement therapy.    From a neurological standpoint the patient can be discharged home once cleared from the hospitalist standpoint.  Stroke neurology will sign off for now.  Please call with any questions or concerns.    Discussed the importance of medication compliance (ASA 325mg and atorvastatin 80mg daily) and lifestyle modifications (adequate blood pressure control, adequate control of hyperlipidemia, and tobacco cessation) to help reduce the risk of future cerebrovascular events.  Also discussed the signs symptoms that would warrant the patient return back to the emergency department including unilateral weakness, unilateral numbness, visual disturbances, loss of balance, speech difficulties, and/or a sudden severe headache.  Patient voices her understanding.  She has been encouraged to contact our office should she have any questions or  concerns prior to her follow-up appointment.    Jennie Pat MSN, APRN, [facility]  Stroke Neurology

## 2023-04-27 NOTE — DISCHARGE SUMMARY
[facility] Medicine Services  DISCHARGE SUMMARY    Patient Name: Charley Mckee  : 1965  MRN: [id number]    Date of Admission: 2023  4:22 PM  Date of Discharge:  2023  Primary Care Physician: Eze Banerjee DO    Consults     Date and Time Order Name Status Description    2023 10:46 AM Inpatient Infectious Diseases Consult Completed     2023  5:47 PM Inpatient Neurosurgery Consult Completed     2023  5:47 PM Inpatient Neurosurgery Consult Completed           Hospital Course     Presenting Problem:   Brain mass [G93.89]  Ataxia [R27.0]    Active Hospital Problems    Diagnosis  POA   • **Ataxia [R27.0]  Yes   • Brain mass [G93.89]  Unknown   • Migraines [G43.909]  Unknown   • Tobacco abuse [Z72.0]  Unknown      Resolved Hospital Problems   No resolved problems to display.          Hospital Course:  Charley Mckee is a 58 y.o. female with history of tobacco abuse and possible bone lesions (determined to be hemangioma) presents with headaches, short term memory deficits, vision loss on right and right sided paresthesias.     Probable subacute stroke-   - initial concern for brain mass, however neurosurgery favors subacute stroke (old L sided occipital polar stroke). Recommends follow up with stroke team and repeat imaging in 3 weeks  - asa 324mg, high intensity statin started  - discontinued dexamethasone  - check full lipid panel, LDL is above goal, continue statin  - echo with bubble  --KIRILL without mass/ vegetation. Negative saline test. Normal EF  - PT/OT--> recommends home  - follow up with Neurosurgery in 3-4 weeks with MRI w/wo  -She was placed on Keppra for seizure prophylaxis given intermittent numbness     Tobacco abuse  -  cessation, nicotine replacement     Leukocytosis--improving after steroids stopped  - suspect secondary to steroids  - no abnormality on CT chest abdomen and pelvis  - afebrile, procalcitonin low  -  ID was consulted- this felt to be secondary to steroids. leukcoytosis already resolving. Antibiotics stopped          Discharge Follow Up Recommendations for outpatient labs/diagnostics:   PCP follow up 1 week  NS follow up 3-4 weeks with repeat MRI brain  Stroke/neurol follow up 4 weeks    Day of Discharge     HPI:   Continues to have headache. Otherwise feeling better. Wants to home    Review of Systems  Gen- No fevers, chills, + H/A, right vision loss  CV- No chest pain, palpitations  Resp- No cough, dyspnea  GI- No N/V/D, abd pain        Vital Signs:   Temp:  [97.6 °F (36.4 °C)-98.2 °F (36.8 °C)] 97.6 °F (36.4 °C)  Heart Rate:  [61-93] 63  Resp:  [16-33] 18  BP: (128-204)/() 140/68      Physical Exam:  Constitutional: No acute distress, awake, alert, appears older than stated age  HENT: NCAT, mucous membranes moist  Respiratory: Clear to auscultation bilaterally, respiratory effort normal   Cardiovascular: RRR, no murmurs, rubs, or gallops  Gastrointestinal: Positive bowel sounds, soft, nontender, nondistended  Musculoskeletal: No bilateral ankle edema  Psychiatric: Appropriate affect, cooperative  Neurologic: Oriented x 3,SANDOVAL equally, speech clear  Skin: No rashes      Pertinent  and/or Most Recent Results     LAB RESULTS:      Lab 04/27/23  0625 04/26/23  0513 04/25/23  0656 04/25/23  0513 04/24/23  0701 04/23/23  1857   WBC 13.29* 14.69*  --  40.84* 27.13* 16.12*   HEMOGLOBIN 14.0 12.6  --  12.6 14.3 15.0   HEMATOCRIT 40.3 37.5  --  37.8 42.5 44.9   PLATELETS 350 350  --  373 393 417   NEUTROS ABS 7.85* 8.53*  --  35.24* 25.01* 14.85*   IMMATURE GRANS (ABS) 0.07* 0.11*  --  0.98* 0.26* 0.15*   LYMPHS ABS 3.79* 4.80*  --  2.65 1.48 1.02   MONOS ABS 1.01* 0.99*  --  1.88* 0.34 0.03*   EOS ABS 0.46* 0.18  --  0.01 0.00 0.00   MCV 98.1* 98.7*  --  100.5* 99.5* 99.3*   CRP  --   --   --   --  0.73*  --    PROCALCITONIN  --  0.03 0.03  --  0.02  --    LACTATE  --  1.1 1.9  --   --   --          Lab  04/26/23  0513 04/25/23  0513 04/24/23  0701 04/23/23  1857   SODIUM 147* 145 142 140   POTASSIUM 3.5 4.0 3.9 4.0   CHLORIDE 115* 114* 111* 109*   CO2 22.0 22.0 19.0* 19.0*   ANION GAP 10.0 9.0 12.0 12.0   BUN 9 13 7 8   CREATININE 0.53* 0.52* 0.60 0.63   EGFR 108.0 108.5 104.8 103.6   GLUCOSE 80 92 146* 162*   CALCIUM 8.0* 8.9 8.9 9.5   HEMOGLOBIN A1C  --   --  5.20  --    TSH  --   --  0.834  --          Lab 04/26/23  0513 04/23/23  1857   TOTAL PROTEIN 5.3* 7.0   ALBUMIN 3.4* 4.4   GLOBULIN 1.9 2.6   ALT (SGPT) 29 7   AST (SGOT) 31 14   BILIRUBIN 0.2 0.2   ALK PHOS 82 129*             Lab 04/25/23  0513   CHOLESTEROL 239*   LDL CHOL 164*   HDL CHOL 36*   TRIGLYCERIDES 208*             Brief Urine Lab Results  (Last result in the past 365 days)      Color   Clarity   Blood   Leuk Est   Nitrite   Protein   CREAT   Urine HCG        04/24/23 1926 Yellow   Clear   Negative   Trace   Negative   Negative               Microbiology Results (last 10 days)     Procedure Component Value - Date/Time    Blood Culture - Blood, Hand, Right [[id number]]  (Normal) Collected: 04/25/23 0855    Lab Status: Preliminary result Specimen: Blood from Hand, Right Updated: 04/26/23 1030     Blood Culture No growth at 24 hours    Blood Culture - Blood, Arm, Left [[id number]]  (Normal) Collected: 04/25/23 0833    Lab Status: Preliminary result Specimen: Blood from Arm, Left Updated: 04/26/23 1030     Blood Culture No growth at 24 hours          Adult Transesophageal Echo (KIRILL) W/ Cont if Necessary Per Protocol    Result Date: 4/26/2023  •  Left ventricular systolic function is normal. Estimated left ventricular EF = 55% •  No evidence of intracardiac thrombus or mass, clear left atrial appendage. •  No evidence of ASD, VSD or PFO.  Saline test results are negative. •  Mild mitral regurgitation. •  Trace tricuspid regurgitation. •  There is a small (<1cm) pericardial effusion.     Adult Transthoracic Echo Complete W/ Cont if Necessary Per  Protocol    Result Date: 4/25/2023  •  Left ventricular systolic function is normal. Calculated left ventricular EF = 58.8% •  Left ventricular diastolic function was normal. •  Saline test results are negative. •  Estimated right ventricular systolic pressure from tricuspid regurgitation is normal (<35 mmHg). •  There is a small (<1cm) pericardial effusion.     CT Head Without Contrast    Result Date: 4/18/2023  HEAD CT     4/18/2023 11:34 AM HISTORY: Acute right-sided headache. COMPARISON: November 2016 TECHNIQUE: Multiple axial CT images were performed from the foramen magnum to the vertex. This study was performed with techniques to keep radiation doses as low as reasonably achievable, (ALARA). Individualized dose reduction techniques using automated exposure control or adjustment of mA and/or kV according to the patient size were employed. FINDINGS: The ventricles are normal in size. There are lacunar infarcts in the left and right basal ganglia that appear chronic. There is no evidence of hemorrhage. No masses are identified. No extra-axial fluid is seen. The sinuses are normal.    No acute intracranial process. Images reviewed, interpreted, and dictated by Dr. MICHAEL Bajwa. Transcribed by Laverne Reid PA-C.    CT Chest With Contrast Diagnostic    Result Date: 4/24/2023  CT CHEST W CONTRAST DIAGNOSTIC, CT ABDOMEN PELVIS W CONTRAST Date of Exam: 4/23/2023 10:09 PM EDT Indication: eval for malignancy. Comparison: PET/CT from May 23, 2018 Technique: Axial CT images were obtained of the chest abdomen and pelvis after the uneventful intravenous administration of 80 mL Isovue-300.  Reconstructed coronal and sagittal images were also obtained. Automated exposure control and iterative construction methods were used. Findings: Chest: The central tracheobronchial tree is clear. There is mild centrilobular emphysema. Multiple benign calcified granulomas are noted. No concerning pulmonary nodule is  identified. There is no focal consolidation. There is no pleural effusion. The heart size appears normal. The great vessels are normal in caliber. No abnormally enlarged lymph nodes are identified. No aggressive osseous lesions are identified. Abdomen/pelvis: The liver, gallbladder, adrenal glands, kidneys, spleen, and pancreas are unremarkable. The stomach appears normal. The small bowel appears normal in caliber and configuration. The colon appears normal. The appendix appears normal. There is no ascites or loculated collection. No abnormally enlarged lymph nodes are identified. The rectum, uterus and adnexa, and urinary bladder are unremarkable. No aggressive osseous lesions are identified.     Impression: 1.No evidence of malignancy within chest/abdomen/pelvis. 2.No acute process identified. 3.Mild emphysema. Electronically Signed: Savage Gill  4/24/2023 9:53 AM EDT  Workstation ID: [id number]    CT Abdomen Pelvis With Contrast    Result Date: 4/24/2023  CT CHEST W CONTRAST DIAGNOSTIC, CT ABDOMEN PELVIS W CONTRAST Date of Exam: 4/23/2023 10:09 PM EDT Indication: eval for malignancy. Comparison: PET/CT from May 23, 2018 Technique: Axial CT images were obtained of the chest abdomen and pelvis after the uneventful intravenous administration of 80 mL Isovue-300.  Reconstructed coronal and sagittal images were also obtained. Automated exposure control and iterative construction methods were used. Findings: Chest: The central tracheobronchial tree is clear. There is mild centrilobular emphysema. Multiple benign calcified granulomas are noted. No concerning pulmonary nodule is identified. There is no focal consolidation. There is no pleural effusion. The heart size appears normal. The great vessels are normal in caliber. No abnormally enlarged lymph nodes are identified. No aggressive osseous lesions are identified. Abdomen/pelvis: The liver, gallbladder, adrenal glands, kidneys, spleen, and pancreas are  unremarkable. The stomach appears normal. The small bowel appears normal in caliber and configuration. The colon appears normal. The appendix appears normal. There is no ascites or loculated collection. No abnormally enlarged lymph nodes are identified. The rectum, uterus and adnexa, and urinary bladder are unremarkable. No aggressive osseous lesions are identified.     Impression: 1.No evidence of malignancy within chest/abdomen/pelvis. 2.No acute process identified. 3.Mild emphysema. Electronically Signed: Savage Gill  4/24/2023 9:53 AM EDT  Workstation ID: [id number]    XR Chest 1 View    Result Date: 4/24/2023  XR CHEST 1 VW Date of Exam: 4/24/2023 8:29 AM EDT Indication: leukocytosis Comparison: CT chest April 23, 2023 FINDINGS: No definite focal or diffuse pulmonary infiltrate is identified. Multiple calcified granulomas are again seen. There are findings of emphysema, as seen on the prior CT. No pneumothorax or significant pleural effusion.  Heart size and mediastinal contour appear within normal limits. Calcification adjacent to the lateral right humeral head may represent calcific tendinopathy of the rotator cuff. There is slight right convex curvature of the thoracic spine.     1.Emphysema. 2.No definite radiographic findings of acute cardiopulmonary abnormality. Electronically Signed: Doug Moeller  4/24/2023 12:03 PM EDT  Workstation ID: [id number]    XR Chest 1 View    Result Date: 4/18/2023  PORTABLE CHEST.    4/18/2023 11:36 AM HISTORY: Right-sided weakness and numbness. COMPARISON: March 2021. FINDINGS: The cardiac silhouette is normal in size. The mediastinum is unremarkable. The lungs demonstrate scattered calcified granulomas at the lung bases. There is no pneumothorax.   Cervical fusion hardware is noted in the lower cervical spine.    No acute cardiopulmonary process. Images reviewed, interpreted, and dictated by Dr. Nikita Frank. Transcribed by Tiffany Valdivia PA-C.    MRI Outside Films    Result  Date: 2023  This procedure was auto-finalized with no dictation required.    CT Angiogram Chest    Result Date: 2023  Name: JOCELIN JEAN : 1965 PE PROTOCOL CHEST CT: HISTORY:  Shortness of breath . COMPARISON: 2018. TECHNIQUE: The patient was injected with  IV contrast . Axial images were obtained through the chest in a PE protocol. 3-D reconstruction images were also performed. Individualized dose reduction techniques using automated exposure control or adjustment of the mA and/or kV according to patient size were employed. FINDINGS: Mediastinal vasculature is well-opacified. No pulmonary artery filling defects are seen. Calcified subcarinal lymph nodes are present. There is a minimal pericardial effusion. On the lung window images, there are mild changes of centrilobular emphysema. Mild dependent edema is seen at the lung bases. Limited images through the upper abdomen appear unremarkable.    1. No evidence of acute pulmonary embolism, as clinically questioned. CT HEAD  ANGIO  WITH CONTRAST  2023 1:15 PM HISTORY:  Right-sided headache TECHNIQUE: Thin-section axial CT with  and without IV contrast supplemented with multiplanar 3D reconstruction of the head. This study was performed with techniques to keep radiation doses as low as reasonably achievable, (ALARA). Individualized dose reduction techniques using automated exposure control or adjustment of mA and/or kV according to the patient size were employed. FINDINGS: CTA: The cranial circulation is unremarkable. There is no significant stenosis, aneurysm, or occlusion. IMPRESSION:   1. No evidence of large vessel occlusion. Images reviewed, interpreted, and dictated by Nikita Frank MD    CTA brain    Result Date: 2023  Name: JOCELIN JEAN : 1965 PE PROTOCOL CHEST CT: HISTORY:  Shortness of breath . COMPARISON: 2018. TECHNIQUE: The patient was injected with  IV contrast . Axial images were  obtained through the chest in a PE protocol. 3-D reconstruction images were also performed. Individualized dose reduction techniques using automated exposure control or adjustment of the mA and/or kV according to patient size were employed. FINDINGS: Mediastinal vasculature is well-opacified. No pulmonary artery filling defects are seen. Calcified subcarinal lymph nodes are present. There is a minimal pericardial effusion. On the lung window images, there are mild changes of centrilobular emphysema. Mild dependent edema is seen at the lung bases. Limited images through the upper abdomen appear unremarkable.    1. No evidence of acute pulmonary embolism, as clinically questioned. CT HEAD  ANGIO  WITH CONTRAST  4/18/2023 1:15 PM HISTORY:  Right-sided headache TECHNIQUE: Thin-section axial CT with  and without IV contrast supplemented with multiplanar 3D reconstruction of the head. This study was performed with techniques to keep radiation doses as low as reasonably achievable, (ALARA). Individualized dose reduction techniques using automated exposure control or adjustment of mA and/or kV according to the patient size were employed. FINDINGS: CTA: The cranial circulation is unremarkable. There is no significant stenosis, aneurysm, or occlusion. IMPRESSION:   1. No evidence of large vessel occlusion. Images reviewed, interpreted, and dictated by Nikita Frank MD    Doppler Transcranial Microbubble Injection CAR    Result Date: 4/25/2023  Normal mean velocities and waveforms are seen in the right and left MCA and terminal ICA Following injection of agitated saline, both before and after Valsalva, no abnormal signals were seen in the left MCA Following injection of agitated saline, after Valsalva, a solitary and faint HIT was noted in the right MCA but was never reproduced on subsequent injections Equivocal bubble study                       CT Outside Films    Result Date: 4/23/2023  This procedure was auto-finalized  with no dictation required.    CT Outside Films    Result Date: 4/23/2023  This procedure was auto-finalized with no dictation required.      Results for orders placed during the hospital encounter of 04/23/23    Doppler Transcranial Microbubble Injection CAR    Interpretation Summary  Normal mean velocities and waveforms are seen in the right and left MCA and terminal ICA    Following injection of agitated saline, both before and after Valsalva, no abnormal signals were seen in the left MCA    Following injection of agitated saline, after Valsalva, a solitary and faint HIT was noted in the right MCA but was never reproduced on subsequent injections    Equivocal bubble study      Results for orders placed during the hospital encounter of 04/23/23    Doppler Transcranial Microbubble Injection CAR    Interpretation Summary  Normal mean velocities and waveforms are seen in the right and left MCA and terminal ICA    Following injection of agitated saline, both before and after Valsalva, no abnormal signals were seen in the left MCA    Following injection of agitated saline, after Valsalva, a solitary and faint HIT was noted in the right MCA but was never reproduced on subsequent injections    Equivocal bubble study      Results for orders placed during the hospital encounter of 04/23/23    Adult Transesophageal Echo (KIRILL) W/ Cont if Necessary Per Protocol    Interpretation Summary  •  Left ventricular systolic function is normal. Estimated left ventricular EF = 55%  •  No evidence of intracardiac thrombus or mass, clear left atrial appendage.  •  No evidence of ASD, VSD or PFO.  Saline test results are negative.  •  Mild mitral regurgitation.  •  Trace tricuspid regurgitation.  •  There is a small (<1cm) pericardial effusion.      Plan for Follow-up of Pending Labs/Results:   Pending Labs     Order Current Status    Blood Culture - Blood, Arm, Left Preliminary result    Blood Culture - Blood, Hand, Right Preliminary  result        Discharge Details        Discharge Medications      New Medications      Instructions Start Date   acetaminophen 325 MG tablet  Commonly known as: TYLENOL   650 mg, Oral, Every 4 Hours PRN      aspirin 325 MG tablet   325 mg, Oral, Daily   Start Date: April 28, 2023     atorvastatin 80 MG tablet  Commonly known as: LIPITOR   80 mg, Oral, Nightly      levETIRAcetam 500 MG tablet  Commonly known as: Keppra   500 mg, Oral, 2 Times Daily      nicotine 21 MG/24HR patch  Commonly known as: NICODERM CQ   1 patch, Transdermal, Every 24 Hours Scheduled      traMADol 50 MG tablet  Commonly known as: ULTRAM   50 mg, Oral, Every 6 Hours PRN             No Known Allergies      Discharge Disposition:  Home or Self Care    Diet:  Hospital:  Diet Order   Procedures   • Diet: Regular/House Diet; Texture: Regular Texture (IDDSI 7); Fluid Consistency: Thin (IDDSI 0)       Activity:  Activity Instructions     Activity as Tolerated            Restrictions or Other Recommendations:    CODE STATUS:    Code Status and Medical Interventions:   Ordered at: 04/23/23 1831     Level Of Support Discussed With:    Patient     Code Status (Patient has no pulse and is not breathing):    CPR (Attempt to Resuscitate)     Medical Interventions (Patient has pulse or is breathing):    Full Support       Future Appointments   Date Time Provider Department Center   5/30/2023 10:15 AM CL MRI 1 BH CL MRI CL   6/1/2023  1:30 PM Henry Hagen PA-C [facility] CL CL       Additional Instructions for the Follow-ups that You Need to Schedule     Ambulatory Referral to Occupational Therapy   As directed      Specialty needed: Evaluate and treat Neuro    Weight Bearing Status: Full weight bearing (as tolerated)    Follow-up needed: Yes         Ambulatory Referral to Physical Therapy Evaluate and treat, Neuro; Full weight bearing (as tolerated)   As directed      Specialty needed: Evaluate and treat Neuro    Weight Bearing Status: Full weight bearing  (as tolerated)    Follow-up needed: Yes         Discharge Follow-up with PCP   As directed       Currently Documented PCP:    Eze Banerjee DO    PCP Phone Number:    [phone]     Follow Up Details: 1 week         Discharge Follow-up with Specified Provider: NATHALIE NS; 3 Weeks   As directed      To: NATHALIE NS    Follow Up: 3 Weeks    Follow Up Details: repeat mri w/wo         Discharge Follow-up with Specified Provider: stroke, [facility]; 1 Month   As directed      To: stroke, [facility]    Follow Up: 1 Month                     ANJALI Gonzalez  04/27/23      Time Spent on Discharge:  I spent  40  minutes on this discharge activity which included: face-to-face encounter with the patient, reviewing the data in the system, coordination of the care with the nursing staff as well as consultants, documentation, and entering orders.        Electronically signed by ANJALI Gonzalez, 04/27/23, 9:28 AM EDT.

## 2023-04-28 ENCOUNTER — READMISSION MANAGEMENT (OUTPATIENT)
Dept: CALL CENTER | Facility: HOSPITAL | Age: 58
End: 2023-04-28

## 2023-04-28 NOTE — OUTREACH NOTE
Prep Survey    Flowsheet Row Responses   [facility] facility patient discharged from? [place]   Is LACE score < 7 ? No   Eligibility Readm Mgmt   Discharge diagnosis Ataxia Brain mass G93.89 Unknown   Does the patient have one of the following disease processes/diagnoses(primary or secondary)? Stroke   Does the patient have Home health ordered? No   Is there a DME ordered? No   Prep survey completed? Yes          KENY LAMBERT - Registered Nurse

## 2023-04-30 LAB
BACTERIA SPEC AEROBE CULT: NORMAL
BACTERIA SPEC AEROBE CULT: NORMAL

## 2023-05-03 ENCOUNTER — READMISSION MANAGEMENT (OUTPATIENT)
Dept: CALL CENTER | Facility: HOSPITAL | Age: 58
End: 2023-05-03

## 2023-05-03 NOTE — OUTREACH NOTE
Stroke Week 1 Survey    Flowsheet Row Responses   [facility] facility patient discharged from? [place]   Does the patient have one of the following disease processes/diagnoses(primary or secondary)? Stroke   Week 1 attempt successful? No   Unsuccessful attempts Attempt 1          Makenna VIDES - Registered Nurse

## 2023-05-16 ENCOUNTER — READMISSION MANAGEMENT (OUTPATIENT)
Dept: CALL CENTER | Facility: HOSPITAL | Age: 58
End: 2023-05-16

## 2023-05-16 NOTE — OUTREACH NOTE
Stroke Week 3 Survey    Flowsheet Row Responses   [facility] patient discharged from? [place]   Does the patient have one of the following disease processes/diagnoses(primary or secondary)? Stroke   Week 3 attempt successful? Yes   Call start time 1449   Call end time 1452   Discharge diagnosis Ataxia Brain mass G93.89 Unknown   Meds reviewed with patient/caregiver? Yes   Is the patient taking all medications as directed (includes completed medication regime)? Yes   Comments regarding appointments 6/1/23 apt with neuro sx   Does the patient have a primary care provider?  Yes   Has the patient kept scheduled appointments due by today? Yes  [PCP apt after discharge ]   Has home health visited the patient within 72 hours of discharge? N/A   Psychosocial issues? No   Does the patient require any assistance with activities of daily living such as eating, bathing, dressing, walking, etc.? No   Does the patient have any residual symptoms from stroke/TIA? Yes   Residual symptoms comments some vision loss in right eye   Does the patient understand the diet ordered at discharge? Yes   Did the patient receive a copy of their discharge instructions? Yes   Nursing interventions Reviewed instructions with patient   What is the patient's perception of their health status since discharge? Improving   Nursing interventions Nurse provided patient education   Is the patient able to teach back FAST for Stroke? B alance: Watch for sudden loss of balance, E yes: Check for vision loss, F ace: Look for an uneven smile, A rm: Check if one arm is weak, S peech: Listen for slurred speech, T santa: Call 9-1-1 right away   Is the patient/caregiver able to teach back the risk factors for a stroke? High blood pressure-goal below 120/80, Smoking, History of TIAs, Physical inactivity and obesity   Is the patient/caregiver able to teach back signs and symptoms related to disease process for when to call PCP? Yes   Is the patient/caregiver able  to teach back signs and symptoms related to disease process for when to call 911? Yes   If the patient is a current smoker, are they able to teach back resources for cessation? [id number]   Is the patient/caregiver able to teach back the hierarchy of who to call/visit for symptoms/problems? PCP, Specialist, Home health nurse, Urgent Care, ED, 911 Yes   Week 3 call completed? Yes   Graduated/Revoked comments Pt doing better-still c/o slight vision loss in right eye          Sandra H - Registered Nurse

## 2023-07-24 ENCOUNTER — HOSPITAL ENCOUNTER (OUTPATIENT)
Dept: MRI IMAGING | Facility: HOSPITAL | Age: 58
Discharge: HOME | End: 2023-07-24
Admitting: NEUROLOGICAL SURGERY
Payer: COMMERCIAL

## 2023-07-24 ENCOUNTER — OFFICE VISIT (OUTPATIENT)
Dept: NEUROSURGERY | Facility: CLINIC | Age: 58
End: 2023-07-24
Payer: COMMERCIAL

## 2023-07-24 VITALS
DIASTOLIC BLOOD PRESSURE: 80 MMHG | BODY MASS INDEX: 23.75 KG/M2 | SYSTOLIC BLOOD PRESSURE: 118 MMHG | WEIGHT: 121 LBS | TEMPERATURE: 97.2 F | HEIGHT: 60 IN

## 2023-07-24 DIAGNOSIS — G93.89 BRAIN MASS: ICD-10-CM

## 2023-07-24 DIAGNOSIS — I63.532 ACUTE LEFT PCA STROKE: Primary | ICD-10-CM

## 2023-07-24 PROCEDURE — 70553 MRI BRAIN STEM W/O & W/DYE: CPT

## 2023-07-24 PROCEDURE — A9577 INJ MULTIHANCE: HCPCS | Performed by: NEUROLOGICAL SURGERY

## 2023-07-24 PROCEDURE — 99214 OFFICE O/P EST MOD 30 MIN: CPT | Performed by: PHYSICIAN ASSISTANT

## 2023-07-24 PROCEDURE — 0 GADOBENATE DIMEGLUMINE 529 MG/ML SOLUTION: Performed by: NEUROLOGICAL SURGERY

## 2023-07-24 RX ORDER — DIAZEPAM 5 MG/1
5 TABLET ORAL
COMMUNITY
Start: 2023-05-03

## 2023-07-24 RX ORDER — BUTALBITAL, ACETAMINOPHEN AND CAFFEINE 50; 325; 40 MG/1; MG/1; MG/1
1 TABLET ORAL
COMMUNITY
Start: 2023-04-18

## 2023-07-24 RX ORDER — ATORVASTATIN CALCIUM 80 MG/1
80 TABLET, FILM COATED ORAL DAILY
Qty: 90 TABLET | Refills: 3 | Status: SHIPPED | OUTPATIENT
Start: 2023-07-24 | End: 2023-07-26 | Stop reason: SDUPTHER

## 2023-07-24 RX ORDER — CITALOPRAM 20 MG/1
20 TABLET ORAL
COMMUNITY

## 2023-07-24 RX ORDER — CITALOPRAM HYDROBROMIDE 10 MG/1
10 TABLET ORAL EVERY MORNING
COMMUNITY
Start: 2023-05-30

## 2023-07-24 RX ORDER — POTASSIUM CHLORIDE 750 MG/1
10 CAPSULE, EXTENDED RELEASE ORAL
COMMUNITY
Start: 2023-07-10

## 2023-07-24 RX ADMIN — GADOBENATE DIMEGLUMINE 10 ML: 529 INJECTION, SOLUTION INTRAVENOUS at 08:14

## 2023-07-24 NOTE — PROGRESS NOTES
Patient: Charley Mckee  : 1965    Primary Care Provider: Eze Banerjee DO      Chief Complaint: Status post stroke    History of Present Illness:       Patient is a very nice 58-year-old female known secondary to recent hospitalization after a subacute stroke in the left-sided occipital polar region.    This is followed more closely secondary to the eloquent position to ensure that she did not have any primary glioma/ADEM or early cerebritis.  Patient is back today with a repeat MRI that shows resolution of the majority of the swelling and expected changes of an evolving stroke.    Patient is pleased with that determination and will continue treating this as a stroke.  I encouraged her to continue follow-ups with her neurologist for stroke prevention.    Patient's symptoms have improved she only has a little bit of a visual deficit that I cannot elicit in the right side periphery.  Patient can easily see all of the visual fields and shows no signs of cranial nerve deficit or motor deficit    Review of Systems   Constitutional:  Negative for activity change, appetite change, chills, fatigue and fever.   HENT:  Positive for tinnitus. Negative for congestion, dental problem, ear pain, hearing loss and sinus pressure.    Eyes:  Positive for visual disturbance. Negative for pain and redness.   Respiratory:  Positive for cough. Negative for apnea, shortness of breath and wheezing.    Cardiovascular:  Negative for chest pain, palpitations and leg swelling.   Gastrointestinal:  Positive for constipation. Negative for abdominal distention, abdominal pain, blood in stool, diarrhea, nausea and vomiting.   Endocrine: Negative for cold intolerance, heat intolerance and polyuria.   Genitourinary:  Positive for urgency. Negative for enuresis and frequency.   Musculoskeletal:  Positive for neck pain.   Skin:  Negative for color change and rash.   Neurological:  Negative for dizziness, tremors, seizures,  "syncope, speech difficulty, weakness, light-headedness, numbness and headaches.   Psychiatric/Behavioral:  Negative for behavioral problems and confusion. The patient is not nervous/anxious.      Past Medical History:     Past Medical History:   Diagnosis Date    Arthritis     Headache     Migraines 4/23/2023    Tobacco abuse 4/23/2023       Family History:     Family History   Problem Relation Age of Onset    Diabetes Mother     Cancer Mother     Stroke Mother     Breast cancer Mother 74    Heart disease Father     Stroke Father     Diabetes Father     Cancer Father         lung cancer    Cancer Son         liver cancer at age 10    Breast cancer Maternal Aunt         DX AGE UNKNOWN    Breast cancer Maternal Aunt         DX AGE UNKNOWN    Breast cancer Maternal Aunt         DX AGE UNKNOWN    Ovarian cancer Neg Hx        Social History:    reports that she has been smoking cigarettes. She has a 40.00 pack-year smoking history. She has never used smokeless tobacco. She reports that she does not drink alcohol and does not use drugs.   SMOKING STATUS: I spent greater than 10 minutes educating the patient on smoking cessation, and discussed how smoking pertains to the particular disease process at hand.  The patient acknowledges understanding.      Surgical History:     Past Surgical History:   Procedure Laterality Date    BREAST EXCISIONAL BIOPSY Right     AGE 36    NECK SURGERY      ACDF C4-5, C5-6 w/ Dr. Aleman       Allergies:   Patient has no known allergies.    Physical Exam:    Vital Signs:/80 (BP Location: Left arm, Patient Position: Sitting, Cuff Size: Adult)   Temp 97.2 °F (36.2 °C) (Infrared)   Ht 152.4 cm (60\")   Wt 54.9 kg (121 lb)   BMI 23.63 kg/m²    BMI: Body mass index is 23.63 kg/m².     GENERAL:           The patient is in no acute distress, and is able to answer all questions appropriately.    Neck:          Supple without lymphadenopathy    Cardiovascular:       Peripheral pulses 2+ at " dorsalis pedis and posterior tibialis    Lungs:         Breathing unlabored    Musculoskeletal:            strength is 5 out of 5 bilaterally.        Shoulder abduction is 5 out of 5.         Dorsiflexion is 5/5 Bilaterally       Plantarflexion is 5/5 bilaterally       Hip Flexion 5/5 bilaterally.         The patient´s gait is normal without antalgia.    Neurologic:          The patient is alert and oriented by 3.          Pupils are equal and reactive to light.         Visual fields are full.         Extraocular movements are intact without nystagmus.         There is no evidence of central motor drift. No facial droop.  No difficulty with rapid alternating movements.         Sensation is equal bilaterally with no deficit.           Reflexes:  2+ through out    CRANIAL NERVES:         Cranial nerve II: Visual fields are full to confrontation.       Cranial nerves III, IV and VI: PERRLA DC.  Extraocular movements are intact.  Nystagmus is not present.       Cranial nerve V: Facial sensation is intact to light touch.       Cranial nerve VII: Muscles of facial expression revealed no asymmetry.       Cranial nerve VIII: Hearing is intact to finger rub bilaterally.       Cranial nerve IX and X: Palate elevates symmetrically.        Cranial nerve XI: Shoulder shrug is intact.       Cranial nerve XII: Tongue is midline without evidence of Atrophy or fasciculation.      Medical Decision Making    Data Review:   MRI compared with 1 from her recent hospitalization.  Medical edema in the area is much less.  Looks like an evolving stroke no real concern for glioma/cerebritis    Diagnosis:   Left occipital pole stroke  Tobacco abuse    Treatment Options:   I encouraged patient to continue on her aspirin and Lipitor.  She will follow-up with neurology next month.  No need for neurosurgical follow-up    BMI is within normal parameters. No other follow-up for BMI required.    No diagnosis found.

## 2023-07-26 ENCOUNTER — TELEPHONE (OUTPATIENT)
Dept: NEUROSURGERY | Facility: CLINIC | Age: 58
End: 2023-07-26

## 2023-07-26 RX ORDER — ATORVASTATIN CALCIUM 80 MG/1
80 TABLET, FILM COATED ORAL DAILY
Qty: 90 TABLET | Refills: 3 | Status: SHIPPED | OUTPATIENT
Start: 2023-07-26

## 2023-07-26 NOTE — TELEPHONE ENCOUNTER
"    Caller: Charley Mckee \"Fifi\"    Relationship to patient: Self    Best call back number:[phone]     Patient is needing:     PATIENT WAS WANTING LIPITOR RX SENT   [facility]  [address] [place] [address]              THANKS!  "

## 2023-07-26 NOTE — TELEPHONE ENCOUNTER
Requested Prescriptions     Pending Prescriptions Disp Refills    atorvastatin (Lipitor) 80 MG tablet 90 tablet 3     Sig: Take 1 tablet by mouth Daily.

## 2023-08-21 NOTE — PROGRESS NOTES
New Patient Office Visit      Encounter Date: 2023   Patient Name: Charley Mckee  : 1965   MRN: [id number]   PCP: Eze Banerjee DO    Referring Provider: No ref. provider found     Chief Complaint:    Chief Complaint   Patient presents with    Follow-up       History of Present Illness: Charley Mckee is a 58 y.o. female with known medical diagnoses of arthritis, s/p ACDF cervical spine, tobacco abuse, and remote work-up for suspicious bone lesion (ultimately identified as a hemangioma) who presented to [facility] as a transfer from an OSH on 2023 with a 5-day history of intermittent right-sided numbness, left-sided headache, right-sided vision loss, gait instability, and memory loss.  Patient had been previously seen at another facility and was treated for complex migraine with Topamax and Fioricet without improvement.  OSH CT head was negative.  OSH CTA H/N revealed moderate PCA stenosis with no other flow-limiting stenosis noted.  MRI at the OSH revealed restricted diffusion in the left occipital lobe with pseudonormalization of the ADC.  Imaging was reviewed by Dr. Landaverde and Dr. Javier who suspects this was a subacute stroke although the possibility of a tumor/low-grade glioma cannot be completely ruled out.  KIRILL was negative for any cardioembolic etiology of stroke.  TCD showed a faint hit in the right MCA territory that was not reproducible.  No PFO noted on KIRILL.  He was discharged home on aspirin 325 mg as well as high-dose statin.  She was placed on Keppra 500 mg for seizure prophylaxis.  She planned to stop smoking and was discharged home on nicotine patches.    Stroke clinic appointment 2023: Repeat MRI on 2023 with evolving left occipital stroke.  No evidence of underlying lesion.  Patient continues to have left homonymous inferior quadrantanopsia.  She reports that she has not followed up with ophthalmology.  Discussed with the patient that she    Problem: Safety  Goal: Will remain free from injury  Outcome: PROGRESSING AS EXPECTED   Fall precautions in place. Treaded socks on pt. Pt room close to nurses station. Bedrails up. Bed in lowest position and locked.  Call light and phone within reach. Patient educated on importance of calling nurses before getting out of bed, verbalizes understanding. Bed alarm on.     Problem: Pain Management  Goal: Pain level will decrease to patient's comfort goal  Outcome: PROGRESSING AS EXPECTED   Pt reports no pain at this time. Will continue to monitor for better pain management.    should follow-up with ophthalmology for visual mapping.  Recommended that she not drive until she is seen by ophthalmology.  Patient reports that her  drives the majority of the time and she does not drive at night.  She has been under tremendous stress as her son is battling cancer.  She is continuing to smoke half pack to 1 pack/day.  She is trying to cut back.  She has been compliant with her aspirin.  She reports that she was not aware that she needed to continue taking atorvastatin as a long-term medication.  It was recently restarted per her PCP.  She has been compliant since that time.  She is working full-time.  She does report intermittent difficulty with her memory.  Will reassess at her follow-up appointment and consider mini cog.    Stroke Risk Factors: hyperlipidemia, hypertension, and smoking      Subjective      Review of Systems:   Review of Systems   Constitutional:  Negative for chills and fever.   HENT:  Negative for congestion and trouble swallowing.    Eyes:  Positive for visual disturbance. Negative for photophobia.   Respiratory:  Negative for cough and shortness of breath.    Cardiovascular:  Negative for chest pain and palpitations.   Gastrointestinal:  Negative for nausea and vomiting.   Musculoskeletal:  Negative for gait problem.   Neurological:  Negative for facial asymmetry, speech difficulty, weakness and numbness.     Past Medical History:   Past Medical History:   Diagnosis Date    Arthritis     Headache     Migraines 4/23/2023    Tobacco abuse 4/23/2023       Past Surgical History:   Past Surgical History:   Procedure Laterality Date    BREAST EXCISIONAL BIOPSY Right     AGE 36    NECK SURGERY      ACDF C4-5, C5-6 w/ Dr. Aleman       Family History:   Family History   Problem Relation Age of Onset    Diabetes Mother     Cancer Mother     Stroke Mother     Breast cancer Mother 74    Heart disease Father     Stroke Father     Diabetes Father     Cancer Father         lung  cancer    Cancer Son         liver cancer at age 10    Breast cancer Maternal Aunt         DX AGE UNKNOWN    Breast cancer Maternal Aunt         DX AGE UNKNOWN    Breast cancer Maternal Aunt         DX AGE UNKNOWN    Ovarian cancer Neg Hx        Social History:   Social History     Socioeconomic History    Marital status:    Tobacco Use    Smoking status: Every Day     Packs/day: 0.50     Years: 40.00     Pack years: 20.00     Types: Cigarettes     Passive exposure: Current    Smokeless tobacco: Never   Vaping Use    Vaping Use: Never used   Substance and Sexual Activity    Alcohol use: Never    Drug use: Never    Sexual activity: Defer       Medications:     Current Outpatient Medications:     acetaminophen (TYLENOL) 325 MG tablet, Take 2 tablets by mouth Every 4 (Four) Hours As Needed for Mild Pain., Disp: , Rfl:     aspirin 325 MG tablet, Take 1 tablet by mouth Daily., Disp: 90 tablet, Rfl: 0    atorvastatin (Lipitor) 80 MG tablet, Take 1 tablet by mouth Daily., Disp: 90 tablet, Rfl: 3    Calcium 200 MG tablet, Take  by mouth., Disp: , Rfl:     citalopram (CeleXA) 20 MG tablet, 1 tablet., Disp: , Rfl:     diazePAM (VALIUM) 5 MG tablet, Take 1 tablet by mouth., Disp: , Rfl:     Magnesium Oxide -Mg Supplement 400 MG capsule, Take 1 capsule by mouth Daily., Disp: , Rfl:     potassium chloride (MICRO-K) 10 MEQ CR capsule, Take 1 capsule by mouth., Disp: , Rfl:     traMADol (ULTRAM) 50 MG tablet, Take 1 tablet by mouth Every 6 (Six) Hours As Needed for Moderate Pain., Disp: 12 tablet, Rfl: 0    butalbital-acetaminophen-caffeine (FIORICET, ESGIC) -40 MG per tablet, 1 tablet., Disp: , Rfl:     citalopram (CeleXA) 10 MG tablet, Take 1 tablet by mouth Every Morning., Disp: , Rfl:     nicotine (NICODERM CQ) 21 MG/24HR patch, Place 1 patch on the skin as directed by provider Daily. (Patient not taking: Reported on 8/23/2023), Disp: 30 each, Rfl: 0    Allergies:   No Known Allergies    Objective     Physical  "Exam:  Vital Signs:   Vitals:    08/23/23 0800   BP: 122/78   Pulse: 81   Temp: 96.4 øF (35.8 øC)   SpO2: 97%   Weight: 57.2 kg (126 lb)   Height: 152.4 cm (60\")     Body mass index is 24.61 kg/mý.     Physical Exam  Constitutional:       Appearance: Normal appearance.   HENT:      Head: Normocephalic and atraumatic.   Eyes:      Extraocular Movements: Extraocular movements intact.      Pupils: Pupils are equal, round, and reactive to light.   Cardiovascular:      Rate and Rhythm: Normal rate and regular rhythm.   Pulmonary:      Effort: Pulmonary effort is normal. No respiratory distress.   Abdominal:      General: There is no distension.      Palpations: Abdomen is soft.   Musculoskeletal:         General: Normal range of motion.      Cervical back: Normal range of motion and neck supple.   Skin:     General: Skin is warm and dry.      Capillary Refill: Capillary refill takes less than 2 seconds.   Neurological:      Mental Status: She is alert and oriented to person, place, and time.      Cranial Nerves: Cranial nerve deficit present.      Sensory: No sensory deficit.      Motor: Motor strength is normal. No weakness.      Coordination: Coordination normal.      Gait: Gait normal.   Psychiatric:         Speech: Speech normal.     Neurological Exam  Mental Status  Alert. Oriented to person, place, time and situation. Oriented to person, place, and time. Speech is normal. Language is fluent with no aphasia.    Cranial Nerves  CN II: Left homonymous inferior quadrantanopsia.  CN III, IV, VI: Extraocular movements intact bilaterally. Pupils equal round and reactive to light bilaterally.  CN V: Facial sensation is normal.  CN VII: Full and symmetric facial movement.  CN IX, X: Palate elevates symmetrically  CN XI: Shoulder shrug strength is normal.  CN XII: Tongue midline without atrophy or fasciculations.    Motor   Strength is 5/5 throughout all four extremities.    Sensory  Light touch is normal in upper and lower " extremities.     Coordination  Right: Finger-to-nose normal.Left: Finger-to-nose normal.    Gait   Normal gait.Casual gait is normal including stance, stride, and arm swing.     NIH Stroke Scale    1a  Level of consciousness: 0=alert; keenly responsive   1b. LOC questions:  0=Answers both questions correctly    1c. LOC commands: 0=Performs both tasks correctly   2.  Best Gaze: 0=normal   3. Visual: 1=Partial hemianopia   4. Facial Palsy: 0=Normal symmetric movement   5a. Motor left arm: 0=No drift, limb holds 90 (or 45) degrees for full 10 seconds   5b.  Motor right arm: 0=No drift, limb holds 90 (or 45) degrees for full 10 seconds   6a. Motor left le=No drift, limb holds 90 (or 45) degrees for full 10 seconds   6b  Motor right le=No drift, limb holds 90 (or 45) degrees for full 10 seconds   7. Limb Ataxia: 0=Absent   8.  Sensory: 0=Normal; no sensory loss   9. Best Language:  0=No aphasia, normal   10. Dysarthria: 0=Normal   11. Extinction and Inattention: 0=No abnormality    Total:   1         Modified Oldham Score: 1        0  No Symptoms    1 No significant disability. Able to carry out all usual activities, despite some symptoms.    2 Slight disability. Able to look after own affairs without assistance, but unable to carry out all previous activities.    3 Moderate disability. Requires some help, but able to walk unassisted.    4 Moderately severe disability. Unable to attend to own bodily needs without assistance, and unable to walk unassisted.    5 Severe disability. Requires constant nursing care and attention, bedridden, incontinent.    6 Dead        PHQ-9 Depression Screening  Little interest or pleasure in doing things? 0-->not at all   Feeling down, depressed, or hopeless? 0-->not at all   Trouble falling or staying asleep, or sleeping too much?     Feeling tired or having little energy?     Poor appetite or overeating?     Feeling bad about yourself - or that you are a failure or have let  yourself or your family down?     Trouble concentrating on things, such as reading the newspaper or watching television?     Moving or speaking so slowly that other people could have noticed? Or the opposite - being so fidgety or restless that you have been moving around a lot more than usual?     Thoughts that you would be better off dead, or of hurting yourself in some way?     PHQ-9 Total Score 0   If you checked off any problems, how difficult have these problems made it for you to do your work, take care of things at home, or get along with other people?       STOP-Bang Score  Have you been diagnosed with Sleep Apnea?: no (patient is scheduled for a sleep study test)    Imaging Reviewed:   OSH MRI brain without revealed an area within the left occipital lobe concerning for a late/subacute stroke versus low-grade glioma.  No evidence of hemorrhage was noted.    TCD with faint HIT in RMCA territory; equivocal.  CT chest/abdomen/pelvis negative for malignancy.     MRI Brain With & Without Contrast    Result Date: 7/24/2023  Impression: Expected appearance of evolving recent left occipital lobe infarct. No acute findings are present. Stable additional chronic findings including bilateral basal ganglia lacunar infarcts. Electronically Signed: Nelson Shaikhord  7/24/2023 8:37 AM EDT  Workstation ID: [id number]    CT chest without & with IV contrast    Result Date: 7/12/2023  No acute disease. No abnormality adjacent to the clavicle is identified.. Images reviewed, interpreted and dictated by Dr. Alhaji Novoa MD    XR clavicle right    Result Date: 6/22/2023  No acute fracture of the right clavicle. Degenerative joint disease. Consider CT scan if there is persistent clinical concern for a sternoclavicular dislocation. Curvilinear radiodensity projecting over the distal right clavicle favored to be artifact on the patient. PA AND LATERAL CHEST INDICATION: Proximal clavicle displaced mass. COMPARISON:  4/18/2023. FINDINGS: PA  and lateral views of the chest were obtained.  The cardiac and mediastinal silhouettes are within normal limits.   There is evidence of prior granulomatous disease. The lungs are otherwise clear. There is no pneumothorax or pleural effusion.  No acute osseous abnormality. IMPRESSION: No radiographic evidence of acute cardiac or pulmonary disease.   Images reviewed, interpreted, and dictated by Norma Amin MD    X-ray chest PA and lateral    Result Date: 6/22/2023  No acute fracture of the right clavicle. Degenerative joint disease. Consider CT scan if there is persistent clinical concern for a sternoclavicular dislocation. Curvilinear radiodensity projecting over the distal right clavicle favored to be artifact on the patient. PA AND LATERAL CHEST INDICATION: Proximal clavicle displaced mass. COMPARISON:  4/18/2023. FINDINGS: PA and lateral views of the chest were obtained.  The cardiac and mediastinal silhouettes are within normal limits.   There is evidence of prior granulomatous disease. The lungs are otherwise clear. There is no pneumothorax or pleural effusion.  No acute osseous abnormality. IMPRESSION: No radiographic evidence of acute cardiac or pulmonary disease.   Images reviewed, interpreted, and dictated by Norma Amin MD     Laboratory Results:   Lipid Panel          4/25/2023    05:13   Lipid Panel   Total Cholesterol 239    Triglycerides 208    HDL Cholesterol 36    VLDL Cholesterol 39    LDL Cholesterol  164    LDL/HDL Ratio 4.48      Results for orders placed during the hospital encounter of 04/23/23    Adult Transesophageal Echo (KIRILL) W/ Cont if Necessary Per Protocol    Interpretation Summary    Left ventricular systolic function is normal. Estimated left ventricular EF = 55%    No evidence of intracardiac thrombus or mass, clear left atrial appendage.    No evidence of ASD, VSD or PFO.  Saline test results are negative.    Mild mitral regurgitation.    Trace tricuspid regurgitation.    There  is a small (<1cm) pericardial effusion.          Assessment / Plan      Assessment/Plan:   Diagnoses and all orders for this visit:    1. History of stroke (Primary)  -MRI on 7/24/2023 with an evolving left occipital lobe stroke as well as chronic bilateral basal ganglia stroke.  No evidence noted of underlying lesion.  Etiology likely L PCA stenosis.  -Continue aspirin 325 mg  -Continue atorvastatin 80 mg daily  -Heart healthy diet  -BP goals less than 130/80  -Increase activity as tolerated  -Follow-up with ophthalmology regarding left homonymous inferior quadrantanopsia.  Discussed with the patient that she should not drive until she follows up with ophthalmology.  -Consider mini cog at next appointment if memory issues continue.  -Keep appointment for sleep study.  -Stop smoking  -Return to the ED with any additional strokelike symptoms    2. Tobacco abuse  -Discharge from the hospital with nicotine patches  -Patient continues to smoke 1/2 to 1 pack/day.  Extensive tobacco cessation counseling provided.  -Patient reports that she is continuing to try to cut back.    3. Hyperlipidemia LDL goal <70  -LDL in April was 164.  Goal less than 70 for secondary stroke prevention.  -Continue atorvastatin 80 mg daily  -Recheck lipid panel at her next appointment      Discussed the importance of medication compliance Aspirin 325mg daily and Atorvastatin 80mg nightly and lifestyle modifications adequate control of blood pressure, adequate control of cholesterol (goal LDL <70), adequate control of glucose (<140, A1c goal <7), smoking cessation, increased physical activity, and implementation of healthy diet to help reduce the risk of future cerebrovascular events.  Also discussed the signs symptoms that would warrant the patient return back to the emergency department including unilateral weakness, unilateral numbness, visual disturbances, loss of balance, speech difficulties, and/or a sudden severe headache.  Patient  verbalized understanding.       Follow Up:   Return in about 6 months (around 2/23/2024).    ANJALI Jacobson, [facility]

## 2023-08-21 NOTE — PATIENT INSTRUCTIONS
Continue aspirin 325 mg daily  Continue atorvastatin 80 mg daily  BP goals less than 130/80  Increase activity as tolerated  Heart healthy diet  Stop Smoking  Follow up with Opthalmology   Return to the ED with any additional strokelike symptoms

## 2023-08-23 ENCOUNTER — OFFICE VISIT (OUTPATIENT)
Dept: NEUROLOGY | Facility: CLINIC | Age: 58
End: 2023-08-23
Payer: COMMERCIAL

## 2023-08-23 VITALS
BODY MASS INDEX: 24.74 KG/M2 | HEIGHT: 60 IN | HEART RATE: 81 BPM | OXYGEN SATURATION: 97 % | DIASTOLIC BLOOD PRESSURE: 78 MMHG | TEMPERATURE: 96.4 F | WEIGHT: 126 LBS | SYSTOLIC BLOOD PRESSURE: 122 MMHG

## 2023-08-23 DIAGNOSIS — Z72.0 TOBACCO ABUSE: Chronic | ICD-10-CM

## 2023-08-23 DIAGNOSIS — Z86.73 HISTORY OF STROKE: Primary | ICD-10-CM

## 2023-08-23 DIAGNOSIS — E78.5 HYPERLIPIDEMIA LDL GOAL <70: ICD-10-CM

## 2024-06-12 PROBLEM — M35.00 SJOGREN'S SYNDROME: Status: ACTIVE | Noted: 2024-06-12

## 2024-06-17 ENCOUNTER — OFFICE VISIT (OUTPATIENT)
Age: 59
End: 2024-06-17
Payer: COMMERCIAL

## 2024-06-17 VITALS
BODY MASS INDEX: 25.13 KG/M2 | HEART RATE: 92 BPM | TEMPERATURE: 98.2 F | SYSTOLIC BLOOD PRESSURE: 120 MMHG | WEIGHT: 128 LBS | DIASTOLIC BLOOD PRESSURE: 70 MMHG | HEIGHT: 60 IN

## 2024-06-17 DIAGNOSIS — Z79.899 HIGH RISK MEDICATION USE: Chronic | ICD-10-CM

## 2024-06-17 DIAGNOSIS — M35.01 SJOGREN'S SYNDROME WITH KERATOCONJUNCTIVITIS SICCA: Primary | Chronic | ICD-10-CM

## 2024-06-17 PROCEDURE — 99214 OFFICE O/P EST MOD 30 MIN: CPT | Performed by: INTERNAL MEDICINE

## 2024-06-17 RX ORDER — PILOCARPINE HYDROCHLORIDE 5 MG/1
5 TABLET, FILM COATED ORAL 3 TIMES DAILY
Qty: 270 TABLET | Refills: 1 | Status: SHIPPED | OUTPATIENT
Start: 2024-06-17

## 2024-06-17 RX ORDER — HYDROXYCHLOROQUINE SULFATE 200 MG/1
200 TABLET, FILM COATED ORAL DAILY
Qty: 90 TABLET | Refills: 1 | Status: SHIPPED | OUTPATIENT
Start: 2024-06-17

## 2024-06-17 NOTE — ASSESSMENT & PLAN NOTE
* Plaquenil 200 mg PO once/day for Sjogren's   * 1st prescribed 10/27/23   On this medication the patient needs to have an eye exam at least once/year to monitor for toxicity. No recent serious infections

## 2024-06-17 NOTE — PROGRESS NOTES
Office Follow Up      Date: 06/17/2024   Patient Name: Charley Mckee  MRN: [id number]  YOB: 1965    Referring Physician: Eze Banerjee*     Chief Complaint   Patient presents with    Sjogren's syndrome     Follow up       History of Present Illness: Charley Mckee is a 59 y.o. female who is here today for follow up.     She established care here at the [facility] as of 10/27/23. We have prescribed her hydroxychloroquine and pilocarpine.     No recent serious injuries or infections. No fever.    Today she rates her pain as 9/10 in severity. She has 5 minutes/day of morning stiffness. No red or hot joints. No swelling. She has back and neck discomfort. Her neck gets stiff. No muscle pain or weakness.    She is fatigued. She has dry eyes and mouth. No trouble swallowing. She is short of breath. No chest pain. She is constipated. She has had urinary incontinence. She does not sleep well at night. She has headaches. She has tingling sensations in the extremities. No hair loss.  No rash. No lymphadenopathy. No abnormal bruising/bleeding.      Subjective     Review of Systems   Constitutional:  Positive for fatigue and unexpected weight gain.   HENT:  Positive for tinnitus.    Eyes:  Positive for visual disturbance (dry eye).   Respiratory:  Positive for cough and shortness of breath.    Cardiovascular: Negative.    Gastrointestinal:  Positive for abdominal distention, blood in stool and constipation.   Endocrine: Negative.    Genitourinary:  Positive for urinary incontinence.   Musculoskeletal:  Positive for back pain, neck pain and neck stiffness.   Skin: Negative.    Allergic/Immunologic: Positive for environmental allergies.   Neurological:  Positive for headache, memory problem and confusion.   Hematological: Negative.    Psychiatric/Behavioral:  Positive for sleep disturbance, depressed mood and stress. The patient is nervous/anxious.   "  All other systems reviewed and are negative.         Current Outpatient Medications:     acetaminophen (TYLENOL) 325 MG tablet, Take 2 tablets by mouth Every 4 (Four) Hours As Needed for Mild Pain., Disp: , Rfl:     aspirin 325 MG tablet, Take 1 tablet by mouth Daily., Disp: 90 tablet, Rfl: 0    atorvastatin (Lipitor) 80 MG tablet, Take 1 tablet by mouth Daily., Disp: 90 tablet, Rfl: 3    butalbital-acetaminophen-caffeine (FIORICET, ESGIC) -40 MG per tablet, 1 tablet., Disp: , Rfl:     Calcium 200 MG tablet, Take  by mouth., Disp: , Rfl:     citalopram (CeleXA) 20 MG tablet, 1 tablet., Disp: , Rfl:     diazePAM (VALIUM) 5 MG tablet, Take 1 tablet by mouth., Disp: , Rfl:     ibuprofen (ADVIL,MOTRIN) 200 MG tablet, Take 1 tablet by mouth Every 6 (Six) Hours As Needed for Mild Pain., Disp: , Rfl:     Magnesium Oxide -Mg Supplement 400 MG capsule, Take 1 capsule by mouth Daily., Disp: , Rfl:     potassium chloride (MICRO-K) 10 MEQ CR capsule, Take 1 capsule by mouth., Disp: , Rfl:     traMADol (ULTRAM) 50 MG tablet, Take 1 tablet by mouth Every 6 (Six) Hours As Needed for Moderate Pain., Disp: 12 tablet, Rfl: 0    hydroxychloroquine (PLAQUENIL) 200 MG tablet, Take 1 tablet by mouth Daily., Disp: 90 tablet, Rfl: 1    pilocarpine (SALAGEN) 5 MG tablet, Take 1 tablet by mouth 3 (Three) Times a Day., Disp: 270 tablet, Rfl: 1    No Known Allergies    I have reviewed and updated the patient's chief complaint, history of present illness, review of systems, past medical history, surgical history, family history, social history, medications and allergy list as appropriate.     Objective      Vitals:    06/17/24 1327   BP: 120/70   BP Location: Left arm   Pulse: 92   Temp: 98.2 °F (36.8 °C)   Weight: 58.1 kg (128 lb)   Height: 152.4 cm (60\")   PainSc: 0-No pain     Body mass index is 25 kg/m².      Physical Exam     General: Well appearing 59 year old  female. Not in distress. She is ambulating unassisted. "   SKIN: No rashes. No alopecia. No subcutaneous nodules. No digital pits or ulcers. No sclerodactyly.   HEENT: NCAT. Conjunctiva clear, no photophobia. No oral or nasal ulcers. Hearing intact.    Pulmonary: Clear to auscultation bilaterally. No wheezing, rales, or rhonchi.  CV: Regular rate and rhythm. No murmurs, rubs, or gallops.   Psych: Normal mood and affect. Alert and oriented x 3.   Extremities: No cyanosis or edema.   Musculoskeletal: No joint swelling or tenderness to palpation. No warmth or erythema. Normal range of motion of the wrists, ankles, elbows, and knees.   Lymph: No palpable cervical adenopathy          Procedures    Assessment / Plan      Assessment & Plan  Sjogren's syndrome with keratoconjunctivitis sicca  * 10/9/23:SSB test was positive  * 10/27/23: She has dry eyes and mouth    1. She needs regular ophthalmic examinations. She has lost all of her teeth   2. Continue/refill hydroxychloroquine  3. Continue/refill pilocarpine   4. We gave her an educational handout on Sjogren's to take home and review   5. Check labs today    6. Follow up in 4-6 months  High risk medication use  * Plaquenil 200 mg PO once/day for Sjogren's   * 1st prescribed 10/27/23   On this medication the patient needs to have an eye exam at least once/year to monitor for toxicity. No recent serious infections    Orders Placed This Encounter   Procedures    CBC Auto Differential    Comprehensive Metabolic Panel    C-reactive Protein    Sedimentation Rate    CK    Urinalysis With Culture If Indicated -     New Medications Ordered This Visit   Medications    pilocarpine (SALAGEN) 5 MG tablet     Sig: Take 1 tablet by mouth 3 (Three) Times a Day.     Dispense:  270 tablet     Refill:  1    hydroxychloroquine (PLAQUENIL) 200 MG tablet     Sig: Take 1 tablet by mouth Daily.     Dispense:  90 tablet     Refill:  1         Follow Up:   Return in about 6 months (around 12/17/2024).      Kashif García DO  [facility]  [place]

## 2024-06-17 NOTE — ASSESSMENT & PLAN NOTE
* 10/9/23:SSB test was positive  * 10/27/23: She has dry eyes and mouth    1. She needs regular ophthalmic examinations. She has lost all of her teeth   2. Continue/refill hydroxychloroquine  3. Continue/refill pilocarpine   4. We gave her an educational handout on Sjogren's to take home and review   5. Check labs today    6. Follow up in 4-6 months

## 2024-07-23 NOTE — PROGRESS NOTES
[facility]    INFECTIOUS DISEASE PROGRESS NOTE    Charley Mckee  1965  [id number]    Date of consult: 4/26/2023    Admit date: 4/23/2023    Requesting Provider: Laila Newby MD  Evaluating physician: Alverto Ortiz MD  Reason for Consultation: Leukocytosis, left occipital mass stroke versus other  Chief Complaint: Above      Subjective   History of present illness:  Patient is a  58 y.o.  Yr old female with a history of DJD, migraines, tobacco use, who presented to [facility] on Tuesday 4/18 secondary to headache and short-term memory deficit and right upper extremity lower extremity numbness and tingling with balance disturbance and mild right visual field decrease vision.  Patient was treated for migraines and discharged home.  Balance difficulties persisted.  Headache was worsened and was evaluated at [facility] where a CT showed CVA versus less likely mass in the occipital area.  MRI was consistent with left occipital CVA and less likely mass per neurology's.  She was admitted to [facility] on 4/23/2023.  No high fevers or chills.  Patient was placed on high-dose dexamethasone since 4/23 through 4/24.  She developed leukocytosis from 16,000 up to 40,000 on 4/25.  No fevers or chills.  Blood cultures from 4/25 obtained.  Urinalysis was unremarkable from 4/24.  I was consulted on 4/25 for the leukocytosis.  No other localizing signs or symptoms of infection.  Chest x-ray had emphysema without infiltrate.  CT scan of the chest with centrilobular emphysema and multiple benign calcified granulomas.  No consolidation.  CT of the abdomen and pelvis without significant findings.  CTA of the brain without large vessel occlusion.  CT of the head consistent with lacunar infarcts in the left and right basal ganglia chronic.  WBC on 4/18 was 14.9.  QTc interval 440 ms on 4/18.  TTE without evidence of  Pt to ED via triage for complaints of respiratory distress. Per family, pt has been increasingly weak over last 1.5 days. Family reports non-productive cough. Pt with hx of dementia. Family reports wife is sick as well.     Pt is slow to respond to triage questions. Pt is warm to touch. Pt unable to walk from car, pt lifted onto gurney by hospital staff.     RN x3, MD x2 at bedside for patient arrival.    endocarditis.  I was consulted on 4/25 for further evaluation and treatment.    4/26/2023 history reviewed.  No high fevers or chills off antibiotics.  White blood cell count 14,000 today.  Lactic acid and procalcitonin level normal.  Blood cultures from 4/25 negative.    Past Medical History:   Diagnosis Date   • Arthritis    • Headache    • Migraines 4/23/2023   • Tobacco abuse 4/23/2023       Past Surgical History:   Procedure Laterality Date   • BREAST EXCISIONAL BIOPSY Right     AGE 36   • NECK SURGERY      ACDF C4-5, C5-6 w/ Dr. Aleman       Pediatric History   Patient Parents   • Shaunna Soni (Mother)     Other Topics Concern   • Not on file   Social History Narrative    .  Lives with . Independent at baseline.  No HH, DMe or oxygen        family history includes Breast cancer in her maternal aunt, maternal aunt, and maternal aunt; Breast cancer (age of onset: 74) in her mother; Cancer in her father, mother, and son; Diabetes in her father and mother; Heart disease in her father; Stroke in her father and mother.    No Known Allergies      There is no immunization history on file for this patient.    Medication:    Current Facility-Administered Medications:   •  acetaminophen (TYLENOL) tablet 650 mg, 650 mg, Oral, Q4H PRN, 650 mg at 04/26/23 0803 **OR** acetaminophen (TYLENOL) 160 MG/5ML solution 650 mg, 650 mg, Oral, Q4H PRN **OR** acetaminophen (TYLENOL) suppository 650 mg, 650 mg, Rectal, Q4H PRN, More Gregg, APRN  •  ALPRAZolam (XANAX) tablet 0.5 mg, 0.5 mg, Oral, TID PRN, More Gregg, APRN, 0.5 mg at 04/25/23 0902  •  aspirin EC tablet 81 mg, 81 mg, Oral, Daily, Radu Us MD, 81 mg at 04/26/23 0822  •  atorvastatin (LIPITOR) tablet 80 mg, 80 mg, Oral, Nightly, Radu Us MD, 80 mg at 04/25/23 2132  •  sennosides-docusate (PERICOLACE) 8.6-50 MG per tablet 2 tablet, 2 tablet, Oral, BID PRN **AND** polyethylene glycol (MIRALAX) packet 17 g, 17 g,  Oral, Daily PRN **AND** bisacodyl (DULCOLAX) EC tablet 5 mg, 5 mg, Oral, Daily PRN **AND** bisacodyl (DULCOLAX) suppository 10 mg, 10 mg, Rectal, Daily PRN, Mroe Gregg, ANJALI  •  famotidine (PEPCID) tablet 40 mg, 40 mg, Oral, Daily, More Gregg APRN, 40 mg at 04/26/23 0822  •  levETIRAcetam in NaCl 0.82% (KEPPRA) IVPB 500 mg, 500 mg, Intravenous, Q12H, Jennie Pat APRN, Last Rate: 0 mL/hr at 04/23/23 2122, 500 mg at 04/26/23 0822  •  nicotine (NICODERM CQ) 21 MG/24HR patch 1 patch, 1 patch, Transdermal, Q24H, More Gregg APRN, 1 patch at 04/26/23 0823  •  sodium chloride 0.9 % flush 10 mL, 10 mL, Intravenous, Q12H, More Gregg APRN, 10 mL at 04/26/23 0822  •  sodium chloride 0.9 % flush 10 mL, 10 mL, Intravenous, PRN, More Gregg, APRN  •  sodium chloride 0.9 % infusion 40 mL, 40 mL, Intravenous, PRN, More Gregg, APRN  •  sodium chloride 0.9 % infusion, 100 mL/hr, Intravenous, Continuous, More Gregg, ANJALI, Last Rate: 100 mL/hr at 04/26/23 0507, 100 mL/hr at 04/26/23 0507    Please refer to the medical record for a full medication list    Review of Systems:    Constitutional-- No Fever, chills or sweats.  Appetite good, and no malaise. No fatigue.  HEENT-- No new vision, hearing or throat complaints.  No epistaxis or oral sores.  Denies odynophagia or dysphagia.  No odynophagia or dysphagia. No headache, photophobia or neck stiffness.  CV-- No chest pain, palpitation or syncope  Resp-- No SOB/cough/Hemoptysis  GI- No nausea, vomiting, or diarrhea.  No hematochezia, melena, or hematemesis. Denies jaundice or chronic liver disease.  -- No dysuria, hematuria, or flank pain.  Denies hesitancy, urgency or flank pain.  Lymph- no swollen lymph nodes in neck/axilla or groin.   Heme- No active bruising or bleeding; no Hx of DVT or PE.  MS-- no swelling or pain in the bones or joints of arms/legs.  No new back  "pain.  Neuro-- No acute focal weakness or numbness in the arms or legs.  No seizures.  Skin--No rashes or lesions, no nodules    Physical Exam:   Vital Signs   Temp:  [97.5 °F (36.4 °C)-98.5 °F (36.9 °C)] 97.9 °F (36.6 °C)  Heart Rate:  [58-74] 58  Resp:  [16-18] 16  BP: (143-163)/(69-85) 163/85    Temp  Min: 97.5 °F (36.4 °C)  Max: 98.5 °F (36.9 °C)  BP  Min: 143/72  Max: 163/85  Pulse  Min: 58  Max: 74  Resp  Min: 16  Max: 18  SpO2  Min: 96 %  Max: 100 %    Blood pressure 163/85, pulse 58, temperature 97.9 °F (36.6 °C), temperature source Oral, resp. rate 16, height 152.4 cm (60\"), weight 51.3 kg (113 lb), SpO2 100 %.  GENERAL: Awake and alert, in no acute distress. Appears older than stated age.  HEENT:  Normocephalic, atraumatic.  Oropharynx without thrush. Dentition in good repair. No cervical adenopathy. No neck masses.  Ears externally normal, Nose externally normal.  EYES: PERRL. No conjunctival injection. No icterus. EOM full.  LYMPHATICS: No lymphadenopathy of the neck or axillary or inguinal regions.   HEART: No murmur, gallop, or pericardial friction rub. Reg rate rhythm, No JVD at 45 degrees.  LUNGS: Clear to auscultation and percussion. No respiratory distress, no use of accessory muscles.  No wheeze.  ABDOMEN: Soft, nontender, nondistended. No appreciable HSM.  Bowel sounds normal.  SKIN: Warm and dry without cutaneous eruptions.  No nodules.    PSYCHIATRIC: Mental status lucid. No confusion.  EXT:  No cellulitic change.  NEURO: Oriented to name, nonfocal.    Results Review:   I reviewed the patient's new clinical results.  I reviewed the patient's new imaging results and agree with the interpretation.  I reviewed the patient's other test results and agree with the interpretation    Results from last 7 days   Lab Units 04/26/23  0513 04/25/23  0513 04/24/23  0701   WBC 10*3/mm3 14.69* 40.84* 27.13*   HEMOGLOBIN g/dL 12.6 12.6 14.3   HEMATOCRIT % 37.5 37.8 42.5   PLATELETS 10*3/mm3 350 373 393 "     Results from last 7 days   Lab Units 04/26/23  0513   SODIUM mmol/L 147*   POTASSIUM mmol/L 3.5   CHLORIDE mmol/L 115*   CO2 mmol/L 22.0   BUN mg/dL 9   CREATININE mg/dL 0.53*   GLUCOSE mg/dL 80   CALCIUM mg/dL 8.0*     Results from last 7 days   Lab Units 04/26/23  0513   ALK PHOS U/L 82   BILIRUBIN mg/dL 0.2   ALT (SGPT) U/L 29   AST (SGOT) U/L 31         Results from last 7 days   Lab Units 04/24/23  0701   CRP mg/dL 0.73*         Results from last 7 days   Lab Units 04/26/23  0513   LACTATE mmol/L 1.1     Estimated Creatinine Clearance: 94.8 mL/min (A) (by C-G formula based on SCr of 0.53 mg/dL (L)).     Procalitonin Results:      Lab 04/26/23  0513 04/25/23  0656 04/24/23  0701   PROCALCITONIN 0.03 0.03 0.02      Brief Urine Lab Results  (Last result in the past 365 days)      Color   Clarity   Blood   Leuk Est   Nitrite   Protein   CREAT   Urine HCG        04/24/23 1926 Yellow   Clear   Negative   Trace   Negative   Negative                No results found for: SITE, ALLENTEST, PHART, RWK6HEQ, PO2ART, JAB0MJW, BASEEXCESS, U0VCSCSL, HGBBG, HCTABG, OXYHEMOGLOBI, METHHGBN, CARBOXYHGB, CO2CT, BAROMETRIC, MODALITY, FIO2     Microbiology:  No results found for: ACANTHNAEG, AFBCX, BPERTUSSISCX, BLOODCX  No results found for: BCIDPCR, CXREFLEX, CSFCX, CULTURETIS  No results found for: CULTURES, HSVCX, URCX  No results found for: EYECULTURE, GCCX, HSVCULTURE, LABHSV  No results found for: LEGIONELLA, MRSACX, MUMPSCX, MYCOPLASCX  No results found for: NOCARDIACX, STOOLCX  No results found for: THROATCX, UNSTIMCULT, URINECX, CULTURE, VZVCULTUR  No results found for: VIRALCULTU, WOUNDCX     No results found for: TISSCXQ, CULTURES, CULTURE, BLOODCX, ANACX, BALCX, ACIDFASTCX, BODYFLDCX, CXREFLEX, FUNGUSCX, RESPCX, MRSACX, ROUTCX, BRCHWSHCLT, TISSUECX, URINECX, CMVCX, WOUNDCX, BCIDPCR, BFCULTURE, BLOODCULT(      Radiology:  Imaging Results (Last 72 Hours)     Procedure Component Value Units Date/Time    XR Chest 1 View  [[id number]] Collected: 04/24/23 1200     Updated: 04/24/23 1206    Narrative:      XR CHEST 1 VW    Date of Exam: 4/24/2023 8:29 AM EDT    Indication: leukocytosis    Comparison: CT chest April 23, 2023    FINDINGS:  No definite focal or diffuse pulmonary infiltrate is identified. Multiple calcified granulomas are again seen. There are findings of emphysema, as seen on the prior CT. No pneumothorax or significant pleural effusion.  Heart size and mediastinal contour   appear within normal limits. Calcification adjacent to the lateral right humeral head may represent calcific tendinopathy of the rotator cuff. There is slight right convex curvature of the thoracic spine.      Impression:      1.Emphysema.  2.No definite radiographic findings of acute cardiopulmonary abnormality.        Electronically Signed: Doug Moeller    4/24/2023 12:03 PM EDT    Workstation ID: [id number]    CT Chest With Contrast Diagnostic [[id number]] Collected: 04/24/23 0946     Updated: 04/24/23 0957    Narrative:      CT CHEST W CONTRAST DIAGNOSTIC, CT ABDOMEN PELVIS W CONTRAST    Date of Exam: 4/23/2023 10:09 PM EDT    Indication: eval for malignancy.    Comparison: PET/CT from May 23, 2018    Technique: Axial CT images were obtained of the chest abdomen and pelvis after the uneventful intravenous administration of 80 mL Isovue-300.  Reconstructed coronal and sagittal images were also obtained. Automated exposure control and iterative   construction methods were used.      Findings:  Chest:    The central tracheobronchial tree is clear. There is mild centrilobular emphysema. Multiple benign calcified granulomas are noted. No concerning pulmonary nodule is identified. There is no focal consolidation. There is no pleural effusion.    The heart size appears normal. The great vessels are normal in caliber. No abnormally enlarged lymph nodes are identified.    No aggressive osseous lesions are identified.    Abdomen/pelvis:    The liver,  gallbladder, adrenal glands, kidneys, spleen, and pancreas are unremarkable.    The stomach appears normal. The small bowel appears normal in caliber and configuration. The colon appears normal. The appendix appears normal. There is no ascites or loculated collection. No abnormally enlarged lymph nodes are identified.    The rectum, uterus and adnexa, and urinary bladder are unremarkable.    No aggressive osseous lesions are identified.      Impression:      Impression:  1.No evidence of malignancy within chest/abdomen/pelvis.  2.No acute process identified.  3.Mild emphysema.      Electronically Signed: Savage Gill    4/24/2023 9:53 AM EDT    Workstation ID: [id number]    CT Abdomen Pelvis With Contrast [[id number]] Collected: 04/24/23 0946     Updated: 04/24/23 0956    Narrative:      CT CHEST W CONTRAST DIAGNOSTIC, CT ABDOMEN PELVIS W CONTRAST    Date of Exam: 4/23/2023 10:09 PM EDT    Indication: eval for malignancy.    Comparison: PET/CT from May 23, 2018    Technique: Axial CT images were obtained of the chest abdomen and pelvis after the uneventful intravenous administration of 80 mL Isovue-300.  Reconstructed coronal and sagittal images were also obtained. Automated exposure control and iterative   construction methods were used.      Findings:  Chest:    The central tracheobronchial tree is clear. There is mild centrilobular emphysema. Multiple benign calcified granulomas are noted. No concerning pulmonary nodule is identified. There is no focal consolidation. There is no pleural effusion.    The heart size appears normal. The great vessels are normal in caliber. No abnormally enlarged lymph nodes are identified.    No aggressive osseous lesions are identified.    Abdomen/pelvis:    The liver, gallbladder, adrenal glands, kidneys, spleen, and pancreas are unremarkable.    The stomach appears normal. The small bowel appears normal in caliber and configuration. The colon appears normal. The appendix appears  normal. There is no ascites or loculated collection. No abnormally enlarged lymph nodes are identified.    The rectum, uterus and adnexa, and urinary bladder are unremarkable.    No aggressive osseous lesions are identified.      Impression:      Impression:  1.No evidence of malignancy within chest/abdomen/pelvis.  2.No acute process identified.  3.Mild emphysema.      Electronically Signed: Savage Gill    4/24/2023 9:53 AM EDT    Workstation ID: [id number]    MRI Outside Films [[id number]] Resulted: 04/23/23 1708     Updated: 04/23/23 1708    Narrative:      This procedure was auto-finalized with no dictation required.    CT Outside Films [[id number]] Resulted: 04/23/23 1702     Updated: 04/23/23 1702    Narrative:      This procedure was auto-finalized with no dictation required.    CT Outside Films [[id number]] Resulted: 04/23/23 1658     Updated: 04/23/23 1658    Narrative:      This procedure was auto-finalized with no dictation required.          IMPRESSION:     1.  Leukocytosis, neutrophilic, related to Decadron use.  Unlikely sepsis or brain abscess.  Urinalysis unremarkable.  Can have a delayed decrease in the leukocytosis after steroids were stopped 4/24.  White blood cell count decreased from 40-14 on 4/26.  2.  Likely left occipital infarct, less likely brain abscess or glioma.  Evaluated by both neurosurgery and neurology.  Recommendations included repeat MRI going forward.  Risk factors include ongoing smoking and hypertension.  TTE without valvular heart disease or endocarditis.  3.  Elevated alkaline phosphatase 129 related to medications.  4.  Essential hypertension.  5.  Migraine headaches.  6.  Hyponatremia 147, new.  7.  Hypocalcemia 8.0, new.    Plan:    1.  Diagnostically, continue to follow patient's physical exam, CBC, CMP, CRP, repeat MRI as recommended by neurosurgery and neurology in the timeframe recommended which likely will be after X number of weeks to months.  Follow cultures which  have been obtained.  2.  Therapeutically, discontinued ceftriaxone on 4/25 and follow off antibiotics.  3.  Supportive care.    I discussed the patient's findings and my recommendations with patient and nursing staff, the patient's , and Dr. Radu Us, and Dr. Ramirez.    Our group would be pleased to follow this patient over the course of their hospitalization and assist with outpatient antimicrobial therapy, as indicated.  Further recommendations depend on the results of the cultures and clinical course.  I will sign off.  Call if needed.    Alverto Ortiz MD  4/26/2023

## 2024-09-06 RX ORDER — ATORVASTATIN CALCIUM 80 MG/1
80 TABLET, FILM COATED ORAL DAILY
Qty: 90 TABLET | Refills: 1 | Status: SHIPPED | OUTPATIENT
Start: 2024-09-06

## 2024-09-06 NOTE — TELEPHONE ENCOUNTER
Provider:  Hieu  Surgery/Procedure:  adolfo  Surgery/Procedure Date:  na  Last visit:   Office Visit with Henry Hagen PA-C (07/24/2023)   Next visit:na     Reason for call: pharmacy has faxed over a refill request for Atorvastatin.        Should the patient be getting this from her PCP now?      I have pended if 1 last refill is approved.    Please Advise. Thank you.    Requested Prescriptions     Pending Prescriptions Disp Refills    atorvastatin (Lipitor) 80 MG tablet 90 tablet 3     Sig: Take 1 tablet by mouth Daily.

## 2024-09-06 NOTE — TELEPHONE ENCOUNTER
I have tried to call the patient and let her know about her medication. We are calling in 1 more refill after that she will have to have her PCP take this over.  I have also called the pharmacy and let Lorie know who I spoke with.  Lorie gave me the patients new number so I could leave a VM.  [phone]